# Patient Record
Sex: FEMALE | Race: WHITE | NOT HISPANIC OR LATINO | Employment: FULL TIME | URBAN - METROPOLITAN AREA
[De-identification: names, ages, dates, MRNs, and addresses within clinical notes are randomized per-mention and may not be internally consistent; named-entity substitution may affect disease eponyms.]

---

## 2021-02-19 ENCOUNTER — APPOINTMENT (OUTPATIENT)
Dept: PREADMISSION TESTING | Facility: HOSPITAL | Age: 71
End: 2021-02-19
Attending: ORTHOPAEDIC SURGERY
Payer: MEDICARE

## 2021-02-19 ENCOUNTER — TRANSCRIBE ORDERS (OUTPATIENT)
Dept: REGISTRATION | Facility: HOSPITAL | Age: 71
End: 2021-02-19

## 2021-02-19 ENCOUNTER — APPOINTMENT (OUTPATIENT)
Dept: LAB | Facility: HOSPITAL | Age: 71
End: 2021-02-19
Attending: ORTHOPAEDIC SURGERY
Payer: MEDICARE

## 2021-02-19 VITALS
RESPIRATION RATE: 18 BRPM | WEIGHT: 145 LBS | DIASTOLIC BLOOD PRESSURE: 86 MMHG | TEMPERATURE: 98.5 F | OXYGEN SATURATION: 99 % | SYSTOLIC BLOOD PRESSURE: 138 MMHG | BODY MASS INDEX: 25.69 KG/M2 | HEIGHT: 63 IN | HEART RATE: 86 BPM

## 2021-02-19 DIAGNOSIS — M54.16 RADICULOPATHY, LUMBAR REGION: Primary | ICD-10-CM

## 2021-02-19 DIAGNOSIS — M54.16 RADICULOPATHY, LUMBAR REGION: ICD-10-CM

## 2021-02-19 DIAGNOSIS — M48.062 SPINAL STENOSIS, LUMBAR REGION WITH NEUROGENIC CLAUDICATION: ICD-10-CM

## 2021-02-19 DIAGNOSIS — Z01.818 ENCOUNTER FOR OTHER PREPROCEDURAL EXAMINATION: ICD-10-CM

## 2021-02-19 LAB
ABO + RH BLD: NORMAL
ANION GAP SERPL CALC-SCNC: 9 MEQ/L (ref 3–15)
APTT PPP: 28 SEC (ref 23–35)
BASOPHILS # BLD: 0.02 K/UL (ref 0.01–0.1)
BASOPHILS NFR BLD: 0.4 %
BLD GP AB SCN SERPL QL: NEGATIVE
BLOOD BANK CMNT PATIENT-IMP: NORMAL
BUN SERPL-MCNC: 27 MG/DL (ref 8–20)
CALCIUM SERPL-MCNC: 9.3 MG/DL (ref 8.9–10.3)
CHLORIDE SERPL-SCNC: 105 MEQ/L (ref 98–109)
CO2 SERPL-SCNC: 29 MEQ/L (ref 22–32)
CREAT SERPL-MCNC: 0.8 MG/DL (ref 0.6–1.1)
D AG BLD QL: POSITIVE
DIFFERENTIAL METHOD BLD: ABNORMAL
EOSINOPHIL # BLD: 0.11 K/UL (ref 0.04–0.36)
EOSINOPHIL NFR BLD: 2 %
ERYTHROCYTE [DISTWIDTH] IN BLOOD BY AUTOMATED COUNT: 12.1 % (ref 11.7–14.4)
GFR SERPL CREATININE-BSD FRML MDRD: >60 ML/MIN/1.73M*2
GLUCOSE SERPL-MCNC: 108 MG/DL (ref 70–99)
HCT VFR BLDCO AUTO: 45.6 % (ref 35–45)
HGB BLD-MCNC: 14.6 G/DL (ref 11.8–15.7)
IMM GRANULOCYTES # BLD AUTO: 0.02 K/UL (ref 0–0.08)
IMM GRANULOCYTES NFR BLD AUTO: 0.4 %
INR PPP: 0.9
LABORATORY COMMENT REPORT: NORMAL
LYMPHOCYTES # BLD: 0.87 K/UL (ref 1.2–3.5)
LYMPHOCYTES NFR BLD: 15.8 %
MCH RBC QN AUTO: 29.9 PG (ref 28–33.2)
MCHC RBC AUTO-ENTMCNC: 32 G/DL (ref 32.2–35.5)
MCV RBC AUTO: 93.4 FL (ref 83–98)
MONOCYTES # BLD: 0.32 K/UL (ref 0.28–0.8)
MONOCYTES NFR BLD: 5.8 %
NEUTROPHILS # BLD: 4.18 K/UL (ref 1.7–7)
NEUTS SEG NFR BLD: 75.6 %
NRBC BLD-RTO: 0 %
PDW BLD AUTO: 11.4 FL (ref 9.4–12.3)
PLATELET # BLD AUTO: 193 K/UL (ref 150–369)
POTASSIUM SERPL-SCNC: 4.1 MEQ/L (ref 3.6–5.1)
PROTHROMBIN TIME: 12.6 SEC (ref 12.2–14.5)
RBC # BLD AUTO: 4.88 M/UL (ref 3.93–5.22)
SODIUM SERPL-SCNC: 143 MEQ/L (ref 136–144)
WBC # BLD AUTO: 5.52 K/UL (ref 3.8–10.5)

## 2021-02-19 PROCEDURE — 99202 OFFICE O/P NEW SF 15 MIN: CPT | Performed by: INTERNAL MEDICINE

## 2021-02-19 PROCEDURE — U0003 INFECTIOUS AGENT DETECTION BY NUCLEIC ACID (DNA OR RNA); SEVERE ACUTE RESPIRATORY SYNDROME CORONAVIRUS 2 (SARS-COV-2) (CORONAVIRUS DISEASE [COVID-19]), AMPLIFIED PROBE TECHNIQUE, MAKING USE OF HIGH THROUGHPUT TECHNOLOGIES AS DESCRIBED BY CMS-2020-01-R: HCPCS

## 2021-02-19 PROCEDURE — 85025 COMPLETE CBC W/AUTO DIFF WBC: CPT

## 2021-02-19 PROCEDURE — C9803 HOPD COVID-19 SPEC COLLECT: HCPCS

## 2021-02-19 PROCEDURE — 85730 THROMBOPLASTIN TIME PARTIAL: CPT | Mod: GA

## 2021-02-19 PROCEDURE — 85610 PROTHROMBIN TIME: CPT | Mod: GA

## 2021-02-19 PROCEDURE — 36415 COLL VENOUS BLD VENIPUNCTURE: CPT

## 2021-02-19 PROCEDURE — 86901 BLOOD TYPING SEROLOGIC RH(D): CPT

## 2021-02-19 PROCEDURE — 80048 BASIC METABOLIC PNL TOTAL CA: CPT

## 2021-02-19 NOTE — CONSULTS
[facility] Medicine Service -  Pre-Operative Consultation       Patient Name: Zaria Peck  Referring Surgeon: Francisco    Reason for Referral: Pre-Operative Evaluation  Surgical Procedure: L2-L5 Posterior Lumbar Decompression  Operative Date: 2/24/21  Other Providers:    PCP: yT Snell DO          HISTORY OF PRESENT ILLNESS      Zaria Peck is a 71 y.o. female presenting today to the [facility] at  for pre-operative evaluation prior to planned surgery.      In regards to medical history: The patient has a pmhx of osteoporosis, lumbar stenosis with radiculopathy, anxiety, h/o LBBB. She has no history of heart disease, lung disease. She denies any CP, SOB, PHILLIPS, palps. No limitations of activity or ADL's sat home due to cardiopulmonary symptoms. Denies any current complaints other than back pain.    The patient denies any current or recent chest pain or pressure, dyspnea, cough, sputum, fevers, chills, abdominal pain, nausea, vomiting, diarrhea or other symptoms.     Functionally, the patient is able to ascend a flight or so of stairs with no dyspnea or chest pain.     The patient denies, on specific questioning, the following:  No history of MI, arrhythmia,or CHF.  No history of RAKESH.  No history of DVT/PE.  No history of COPD.  No history of CVA.  No history of DM.   No history of CKD.     PAST MEDICAL AND SURGICAL HISTORY      Past Medical History:   Diagnosis Date   • Anxiety    • Left bundle branch block (LBBB)    • Lumbar radiculopathy    • Lumbar stenosis    • Osteoporosis    • Use of cane as ambulatory aid        Past Surgical History:   Procedure Laterality Date   • CHOLECYSTECTOMY     • EYE SURGERY      dettached retina left   • FOOT SURGERY Bilateral    • KNEE CARTILAGE SURGERY Bilateral        MEDICATIONS        Current Outpatient Medications:   •  acetaminophen (TYLENOL) 325 mg tablet, Take by mouth every 6 (six) hours as needed for mild pain.,  "Disp: , Rfl:   •  diazePAM (VALIUM) 2 mg tablet, Take 2 mg by mouth every 6 (six) hours as needed for anxiety., Disp: , Rfl:   •  HYDROcodone-acetaminophen (NORCO) 7.5-325 mg per tablet, Take 1 tablet by mouth every 6 (six) hours as needed for moderate pain., Disp: , Rfl:   •  raloxifene (EVISTA) 60 mg tablet, Take 60 mg by mouth daily., Disp: , Rfl:     ALLERGIES      Patient has no known allergies.    FAMILY HISTORY      family history is not on file.    Denies any prior known family history of DVTs/PEs/clotting disorder    SOCIAL HISTORY      Social History     Tobacco Use   • Smoking status: Never Smoker   • Smokeless tobacco: Never Used   Substance Use Topics   • Alcohol use: Yes     Alcohol/week: 3.0 standard drinks     Types: 3 Glasses of wine per week   • Drug use: Never       REVIEW OF SYSTEMS      All other systems reviewed and negative except as noted in HPI    PHYSICAL EXAMINATION      Visit Vitals  /86 (BP Location: Left upper arm, Patient Position: Sitting)   Pulse 86   Temp 36.9 °C (98.5 °F) (Temporal)   Resp 18   Ht 1.6 m (5' 3\")   Wt 65.8 kg (145 lb)   SpO2 99%   BMI 25.69 kg/m²     Body mass index is 25.69 kg/m².    Physical Exam  Vitals signs and nursing note reviewed.   Constitutional:       General: She is not in acute distress.     Appearance: Normal appearance. She is not ill-appearing, toxic-appearing or diaphoretic.   HENT:      Head: Normocephalic and atraumatic.      Right Ear: External ear normal.      Left Ear: External ear normal.      Nose: Nose normal.   Eyes:      General: No scleral icterus.        Right eye: No discharge.         Left eye: No discharge.      Pupils: Pupils are equal, round, and reactive to light.   Neck:      Musculoskeletal: Normal range of motion. No neck rigidity.   Cardiovascular:      Rate and Rhythm: Normal rate and regular rhythm.      Pulses: Normal pulses.      Heart sounds: Normal heart sounds. No murmur. No friction rub. No gallop.    Pulmonary:    "   Effort: Pulmonary effort is normal. No respiratory distress.      Breath sounds: Normal breath sounds. No stridor. No wheezing, rhonchi or rales.   Chest:      Chest wall: No tenderness.   Abdominal:      General: Abdomen is flat. There is no distension.      Palpations: Abdomen is soft. There is no mass.      Tenderness: There is no abdominal tenderness. There is no guarding or rebound.      Hernia: No hernia is present.   Musculoskeletal:         General: No swelling, tenderness, deformity or signs of injury.      Right lower leg: No edema.      Left lower leg: No edema.   Skin:     General: Skin is warm and dry.      Coloration: Skin is not jaundiced or pale.      Findings: No bruising, erythema, lesion or rash.   Neurological:      General: No focal deficit present.      Mental Status: She is alert and oriented to person, place, and time.      Comments: Sitting in wheelchair due to weakness and back pain.      Psychiatric:         Mood and Affect: Mood normal.         Behavior: Behavior normal.         Thought Content: Thought content normal.         Judgment: Judgment normal.         LABS / EKG        Labs  WB C 5.5  Hb 14.6  Plt 193    Creat 0.8  K 4.1  Rest of Chem 7 negative.       ECG/Telemetry  EKG: NSR, rate 70's, left axis deviation, LBBB, no signs of acute ischemia.   No other EKG on file.     ASSESSMENT AND PLAN         No problem-specific Assessment & Plan notes found for this encounter.       In regards to perioperative cardiac risk:  The patient denies any history of ischemic heart disease, denies any history of CHF, denies any history of CVA, is not on pre-operative treatment with insulin, and does not have a pre-operative creatinine > 2 mg/dL.   The Revised Cardiac Risk Index (RCRI) for this patient indicates Class I risk, score of 0 points.   [place] Perioperative Risk Score is 0.     The patient has no history of cardiopulmonary disease, has no current complaints, has stable vital signs, a  normal heart and lung exam today. She has an EKG which shows a known LBBB, but no signs of ischemia. Labs are normal today.     The patient is a low risk patient for a low risk spinal surgery. No further testing is needed at this time.    Further comments:  Resume supplements when OK with surgical team.  I would encourage incentive spirometry to assist with minimizing say-operative pulmonary risk.  DVT prophylaxis and timing of such per the discretion of the surgeon.     Please do not hesitate to contact [facility] during the upcoming hospitalization with any questions or concerns.     Josesito Tillman MD  2/19/2021

## 2021-02-19 NOTE — H&P (VIEW-ONLY)
[facility] Medicine Service -  Pre-Operative Consultation       Patient Name: Zaria Peck  Referring Surgeon: Francisco    Reason for Referral: Pre-Operative Evaluation  Surgical Procedure: L2-L5 Posterior Lumbar Decompression  Operative Date: 2/24/21  Other Providers:    PCP: Ty Snell DO          HISTORY OF PRESENT ILLNESS      Zaria Peck is a 71 y.o. female presenting today to the [facility] at  for pre-operative evaluation prior to planned surgery.      In regards to medical history: The patient has a pmhx of osteoporosis, lumbar stenosis with radiculopathy, anxiety, h/o LBBB. She has no history of heart disease, lung disease. She denies any CP, SOB, PHILLIPS, palps. No limitations of activity or ADL's sat home due to cardiopulmonary symptoms. Denies any current complaints other than back pain.    The patient denies any current or recent chest pain or pressure, dyspnea, cough, sputum, fevers, chills, abdominal pain, nausea, vomiting, diarrhea or other symptoms.     Functionally, the patient is able to ascend a flight or so of stairs with no dyspnea or chest pain.     The patient denies, on specific questioning, the following:  No history of MI, arrhythmia,or CHF.  No history of RAKESH.  No history of DVT/PE.  No history of COPD.  No history of CVA.  No history of DM.   No history of CKD.     PAST MEDICAL AND SURGICAL HISTORY      Past Medical History:   Diagnosis Date   • Anxiety    • Left bundle branch block (LBBB)    • Lumbar radiculopathy    • Lumbar stenosis    • Osteoporosis    • Use of cane as ambulatory aid        Past Surgical History:   Procedure Laterality Date   • CHOLECYSTECTOMY     • EYE SURGERY      dettached retina left   • FOOT SURGERY Bilateral    • KNEE CARTILAGE SURGERY Bilateral        MEDICATIONS        Current Outpatient Medications:   •  acetaminophen (TYLENOL) 325 mg tablet, Take by mouth every 6 (six) hours as needed for mild pain.,  "Disp: , Rfl:   •  diazePAM (VALIUM) 2 mg tablet, Take 2 mg by mouth every 6 (six) hours as needed for anxiety., Disp: , Rfl:   •  HYDROcodone-acetaminophen (NORCO) 7.5-325 mg per tablet, Take 1 tablet by mouth every 6 (six) hours as needed for moderate pain., Disp: , Rfl:   •  raloxifene (EVISTA) 60 mg tablet, Take 60 mg by mouth daily., Disp: , Rfl:     ALLERGIES      Patient has no known allergies.    FAMILY HISTORY      family history is not on file.    Denies any prior known family history of DVTs/PEs/clotting disorder    SOCIAL HISTORY      Social History     Tobacco Use   • Smoking status: Never Smoker   • Smokeless tobacco: Never Used   Substance Use Topics   • Alcohol use: Yes     Alcohol/week: 3.0 standard drinks     Types: 3 Glasses of wine per week   • Drug use: Never       REVIEW OF SYSTEMS      All other systems reviewed and negative except as noted in HPI    PHYSICAL EXAMINATION      Visit Vitals  /86 (BP Location: Left upper arm, Patient Position: Sitting)   Pulse 86   Temp 36.9 °C (98.5 °F) (Temporal)   Resp 18   Ht 1.6 m (5' 3\")   Wt 65.8 kg (145 lb)   SpO2 99%   BMI 25.69 kg/m²     Body mass index is 25.69 kg/m².    Physical Exam  Vitals signs and nursing note reviewed.   Constitutional:       General: She is not in acute distress.     Appearance: Normal appearance. She is not ill-appearing, toxic-appearing or diaphoretic.   HENT:      Head: Normocephalic and atraumatic.      Right Ear: External ear normal.      Left Ear: External ear normal.      Nose: Nose normal.   Eyes:      General: No scleral icterus.        Right eye: No discharge.         Left eye: No discharge.      Pupils: Pupils are equal, round, and reactive to light.   Neck:      Musculoskeletal: Normal range of motion. No neck rigidity.   Cardiovascular:      Rate and Rhythm: Normal rate and regular rhythm.      Pulses: Normal pulses.      Heart sounds: Normal heart sounds. No murmur. No friction rub. No gallop.    Pulmonary:    "   Effort: Pulmonary effort is normal. No respiratory distress.      Breath sounds: Normal breath sounds. No stridor. No wheezing, rhonchi or rales.   Chest:      Chest wall: No tenderness.   Abdominal:      General: Abdomen is flat. There is no distension.      Palpations: Abdomen is soft. There is no mass.      Tenderness: There is no abdominal tenderness. There is no guarding or rebound.      Hernia: No hernia is present.   Musculoskeletal:         General: No swelling, tenderness, deformity or signs of injury.      Right lower leg: No edema.      Left lower leg: No edema.   Skin:     General: Skin is warm and dry.      Coloration: Skin is not jaundiced or pale.      Findings: No bruising, erythema, lesion or rash.   Neurological:      General: No focal deficit present.      Mental Status: She is alert and oriented to person, place, and time.      Comments: Sitting in wheelchair due to weakness and back pain.      Psychiatric:         Mood and Affect: Mood normal.         Behavior: Behavior normal.         Thought Content: Thought content normal.         Judgment: Judgment normal.         LABS / EKG        Labs  WB C 5.5  Hb 14.6  Plt 193    Creat 0.8  K 4.1  Rest of Chem 7 negative.       ECG/Telemetry  EKG: NSR, rate 70's, left axis deviation, LBBB, no signs of acute ischemia.   No other EKG on file.     ASSESSMENT AND PLAN         No problem-specific Assessment & Plan notes found for this encounter.       In regards to perioperative cardiac risk:  The patient denies any history of ischemic heart disease, denies any history of CHF, denies any history of CVA, is not on pre-operative treatment with insulin, and does not have a pre-operative creatinine > 2 mg/dL.   The Revised Cardiac Risk Index (RCRI) for this patient indicates Class I risk, score of 0 points.   [place] Perioperative Risk Score is 0.     The patient has no history of cardiopulmonary disease, has no current complaints, has stable vital signs, a  normal heart and lung exam today. She has an EKG which shows a known LBBB, but no signs of ischemia. Labs are normal today.     The patient is a low risk patient for a low risk spinal surgery. No further testing is needed at this time.    Further comments:  Resume supplements when OK with surgical team.  I would encourage incentive spirometry to assist with minimizing say-operative pulmonary risk.  DVT prophylaxis and timing of such per the discretion of the surgeon.     Please do not hesitate to contact [facility] [place] during the upcoming hospitalization with any questions or concerns.     Josesito Tillman MD  2/19/2021

## 2021-02-20 LAB — SARS-COV-2 RNA RESP QL NAA+PROBE: NOT DETECTED

## 2021-02-23 ENCOUNTER — ANESTHESIA EVENT (OUTPATIENT)
Dept: OPERATING ROOM | Facility: HOSPITAL | Age: 71
Setting detail: SURGERY ADMIT
DRG: 455 | End: 2021-02-23
Payer: MEDICARE

## 2021-02-23 NOTE — ANESTHESIOLOGIST PRE-PROCEDURE CHART REVIEW
I confirm that I have reviewed the available information in the medical record prior to the scheduled surgery.  There is no problem list on file for this patient.      Past Surgical History:   Procedure Laterality Date   • CHOLECYSTECTOMY     • EYE SURGERY      dettached retina left   • FOOT SURGERY Bilateral    • KNEE CARTILAGE SURGERY Bilateral        No current facility-administered medications for this encounter.        Prior to Admission medications    Medication Sig Start Date End Date Taking? Authorizing Provider   acetaminophen (TYLENOL) 325 mg tablet Take by mouth every 6 (six) hours as needed for mild pain.    Jesse Azar MD   diazePAM (VALIUM) 2 mg tablet Take 2 mg by mouth every 6 (six) hours as needed for anxiety.    Jesse Azar MD   HYDROcodone-acetaminophen (NORCO) 7.5-325 mg per tablet Take 1 tablet by mouth every 6 (six) hours as needed for moderate pain.    Jesse Azar MD   raloxifene (EVISTA) 60 mg tablet Take 60 mg by mouth daily.    Jesse Azar MD       CBC Results       02/19/21 06/13/15 06/12/15                    0852 [id number] [id number]         WBC 5.52 6.32 7.53         RBC 4.88 3.48 3.66         HGB 14.6 10.6 11.2         HCT 45.6 32.4 34.4         MCV 93.4 93.1 94.0         MCH 29.9 30.5 30.6         MCHC 32.0 32.7 32.6          131 139                       BMP Results       02/19/21 06/13/15 06/12/15                    0852 0346 0437          140 139         K 4.1 3.9 3.7         Cl 105 110 109         CO2 29 27 27         Glucose 108 91 104         BUN 27 12 17         Creatinine 0.8 0.6 0.8         Calcium 9.3 7.9 7.8         Anion Gap 9 3 3         EGFR >60.0 -- --                       No results found for: HCGPREGUR, PREGSERUM, HCG, HCGQUANT    Results from last 7 days   Lab Units 02/19/21  0852   INR  0.9   PTT sec 28       X-RAY LUMBAR SPINE 2 OR 3 VIEWS    (Results Pending)   FL FLUOROSCOPY TECHNICAL ASSISTANCE    (Results Pending)

## 2021-02-24 ENCOUNTER — HOSPITAL ENCOUNTER (OUTPATIENT)
Dept: RADIOLOGY | Facility: HOSPITAL | Age: 71
Setting detail: SURGERY ADMIT
Discharge: HOME | DRG: 455 | End: 2021-02-24
Attending: ORTHOPAEDIC SURGERY
Payer: MEDICARE

## 2021-02-24 ENCOUNTER — HOSPITAL ENCOUNTER (OUTPATIENT)
Dept: RADIOLOGY | Facility: HOSPITAL | Age: 71
Setting detail: SURGERY ADMIT
DRG: 455 | End: 2021-02-24
Attending: ORTHOPAEDIC SURGERY
Payer: MEDICARE

## 2021-02-24 ENCOUNTER — HOSPITAL ENCOUNTER (INPATIENT)
Facility: HOSPITAL | Age: 71
LOS: 4 days | Discharge: HOME HEALTH | DRG: 455 | End: 2021-02-28
Attending: ORTHOPAEDIC SURGERY | Admitting: ORTHOPAEDIC SURGERY
Payer: MEDICARE

## 2021-02-24 ENCOUNTER — ANESTHESIA (OUTPATIENT)
Dept: OPERATING ROOM | Facility: HOSPITAL | Age: 71
Setting detail: SURGERY ADMIT
DRG: 455 | End: 2021-02-24
Payer: MEDICARE

## 2021-02-24 DIAGNOSIS — Z98.890 STATUS POST LUMBAR SPINE SURGERY FOR DECOMPRESSION OF SPINAL CORD: ICD-10-CM

## 2021-02-24 PROBLEM — K21.9 GASTROESOPHAGEAL REFLUX DISEASE WITHOUT ESOPHAGITIS: Status: ACTIVE | Noted: 2021-02-24

## 2021-02-24 PROBLEM — I44.7 LBBB (LEFT BUNDLE BRANCH BLOCK): Status: ACTIVE | Noted: 2021-02-24

## 2021-02-24 PROBLEM — I10 ESSENTIAL HYPERTENSION: Status: ACTIVE | Noted: 2021-02-24

## 2021-02-24 PROCEDURE — 63600000 HC DRUGS/DETAIL CODE: Performed by: ORTHOPAEDIC SURGERY

## 2021-02-24 PROCEDURE — 25000000 HC PHARMACY GENERAL: Performed by: STUDENT IN AN ORGANIZED HEALTH CARE EDUCATION/TRAINING PROGRAM

## 2021-02-24 PROCEDURE — C1713 ANCHOR/SCREW BN/BN,TIS/BN: HCPCS | Performed by: ORTHOPAEDIC SURGERY

## 2021-02-24 PROCEDURE — 25000000 HC PHARMACY GENERAL: Performed by: ORTHOPAEDIC SURGERY

## 2021-02-24 PROCEDURE — 63700000 HC SELF-ADMINISTRABLE DRUG: Performed by: STUDENT IN AN ORGANIZED HEALTH CARE EDUCATION/TRAINING PROGRAM

## 2021-02-24 PROCEDURE — 25800000 HC PHARMACY IV SOLUTIONS: Performed by: STUDENT IN AN ORGANIZED HEALTH CARE EDUCATION/TRAINING PROGRAM

## 2021-02-24 PROCEDURE — 63600000 HC DRUGS/DETAIL CODE: Performed by: ANESTHESIOLOGY

## 2021-02-24 PROCEDURE — 71000001 HC PACU PHASE 1 INITIAL 30MIN: Performed by: ORTHOPAEDIC SURGERY

## 2021-02-24 PROCEDURE — 25000000 HC PHARMACY GENERAL: Performed by: NURSE ANESTHETIST, CERTIFIED REGISTERED

## 2021-02-24 PROCEDURE — 36000014 HC OR LEVEL 4 EA ADDL MIN: Performed by: ORTHOPAEDIC SURGERY

## 2021-02-24 PROCEDURE — 27800000 HC SUPPLY/IMPLANTS: Performed by: ORTHOPAEDIC SURGERY

## 2021-02-24 PROCEDURE — 36000025 HC CELL SAVER PROCED

## 2021-02-24 PROCEDURE — 63600000 HC DRUGS/DETAIL CODE: Performed by: STUDENT IN AN ORGANIZED HEALTH CARE EDUCATION/TRAINING PROGRAM

## 2021-02-24 PROCEDURE — 63600000 HC DRUGS/DETAIL CODE: Performed by: NURSE ANESTHETIST, CERTIFIED REGISTERED

## 2021-02-24 PROCEDURE — 25800000 HC PHARMACY IV SOLUTIONS: Performed by: NURSE ANESTHETIST, CERTIFIED REGISTERED

## 2021-02-24 PROCEDURE — 36000004 HC OR LEVEL 4 INITIAL 30MIN: Performed by: ORTHOPAEDIC SURGERY

## 2021-02-24 PROCEDURE — 72100 X-RAY EXAM L-S SPINE 2/3 VWS: CPT

## 2021-02-24 PROCEDURE — 99232 SBSQ HOSP IP/OBS MODERATE 35: CPT | Performed by: INTERNAL MEDICINE

## 2021-02-24 PROCEDURE — 37000001 HC ANESTHESIA GENERAL: Performed by: ORTHOPAEDIC SURGERY

## 2021-02-24 PROCEDURE — 12000000 HC ROOM AND CARE MED/SURG

## 2021-02-24 PROCEDURE — 71000011 HC PACU PHASE 1 EA ADDL MIN: Performed by: ORTHOPAEDIC SURGERY

## 2021-02-24 PROCEDURE — 27200000 HC STERILE SUPPLY: Performed by: ORTHOPAEDIC SURGERY

## 2021-02-24 PROCEDURE — 25800000 HC PHARMACY IV SOLUTIONS: Performed by: ORTHOPAEDIC SURGERY

## 2021-02-24 DEVICE — SCREW EXPEDIUM 7.0 X 45MM: Type: IMPLANTABLE DEVICE | Site: SPINE LUMBAR | Status: FUNCTIONAL

## 2021-02-24 DEVICE — SCREW EXPEDIUM 7.5 X 45MM: Type: IMPLANTABLE DEVICE | Site: SPINE LUMBAR | Status: FUNCTIONAL

## 2021-02-24 DEVICE — CAGE 12 X 26MM/9-13MM EXPANDABLE CALIBUR: Type: IMPLANTABLE DEVICE | Site: SPINE LUMBAR | Status: FUNCTIONAL

## 2021-02-24 DEVICE — BONE CHIPS 30CC FINE FILLER: Type: IMPLANTABLE DEVICE | Site: SPINE LUMBAR | Status: FUNCTIONAL

## 2021-02-24 DEVICE — ROD 90MM SPINE: Type: IMPLANTABLE DEVICE | Site: SPINE LUMBAR | Status: FUNCTIONAL

## 2021-02-24 DEVICE — SET SCREWS EXPEDIUM: Type: IMPLANTABLE DEVICE | Site: SPINE LUMBAR | Status: FUNCTIONAL

## 2021-02-24 DEVICE — ROD EXPEDIUM 85MM: Type: IMPLANTABLE DEVICE | Site: SPINE LUMBAR | Status: FUNCTIONAL

## 2021-02-24 DEVICE — SCREW EXPEDIUM 6 X 45MM: Type: IMPLANTABLE DEVICE | Site: SPINE LUMBAR | Status: FUNCTIONAL

## 2021-02-24 DEVICE — BOAT 3DEMIN OSTEOSPONGE 2.5CM X 10CM: Type: IMPLANTABLE DEVICE | Site: SPINE LUMBAR | Status: FUNCTIONAL

## 2021-02-24 RX ORDER — POLYETHYLENE GLYCOL 3350 17 G/17G
17 POWDER, FOR SOLUTION ORAL DAILY
Status: DISCONTINUED | OUTPATIENT
Start: 2021-02-24 | End: 2021-02-28 | Stop reason: HOSPADM

## 2021-02-24 RX ORDER — SODIUM CHLORIDE 9 MG/ML
INJECTION, SOLUTION INTRAVENOUS CONTINUOUS
Status: ACTIVE | OUTPATIENT
Start: 2021-02-24 | End: 2021-02-25

## 2021-02-24 RX ORDER — PANTOPRAZOLE SODIUM 20 MG/1
20 TABLET, DELAYED RELEASE ORAL DAILY
Status: DISCONTINUED | OUTPATIENT
Start: 2021-02-24 | End: 2021-02-28 | Stop reason: HOSPADM

## 2021-02-24 RX ORDER — ONDANSETRON 4 MG/1
4 TABLET, ORALLY DISINTEGRATING ORAL EVERY 8 HOURS PRN
Status: DISCONTINUED | OUTPATIENT
Start: 2021-02-24 | End: 2021-02-28 | Stop reason: HOSPADM

## 2021-02-24 RX ORDER — ONDANSETRON HYDROCHLORIDE 2 MG/ML
4 INJECTION, SOLUTION INTRAVENOUS
Status: DISCONTINUED | OUTPATIENT
Start: 2021-02-24 | End: 2021-02-24 | Stop reason: HOSPADM

## 2021-02-24 RX ORDER — SODIUM CHLORIDE 0.9 G/100ML
INJECTION, SOLUTION IRRIGATION AS NEEDED
Status: DISCONTINUED | OUTPATIENT
Start: 2021-02-24 | End: 2021-02-24 | Stop reason: HOSPADM

## 2021-02-24 RX ORDER — ONDANSETRON HYDROCHLORIDE 2 MG/ML
INJECTION, SOLUTION INTRAVENOUS AS NEEDED
Status: DISCONTINUED | OUTPATIENT
Start: 2021-02-24 | End: 2021-02-24 | Stop reason: SURG

## 2021-02-24 RX ORDER — SODIUM CHLORIDE 9 MG/ML
INJECTION, SOLUTION INTRAVENOUS CONTINUOUS
Status: DISCONTINUED | OUTPATIENT
Start: 2021-02-24 | End: 2021-02-24

## 2021-02-24 RX ORDER — FENTANYL CITRATE 50 UG/ML
50 INJECTION, SOLUTION INTRAMUSCULAR; INTRAVENOUS EVERY 5 MIN PRN
Status: DISCONTINUED | OUTPATIENT
Start: 2021-02-24 | End: 2021-02-24 | Stop reason: HOSPADM

## 2021-02-24 RX ORDER — AMOXICILLIN 250 MG
1 CAPSULE ORAL 2 TIMES DAILY
Status: DISCONTINUED | OUTPATIENT
Start: 2021-02-24 | End: 2021-02-28 | Stop reason: HOSPADM

## 2021-02-24 RX ORDER — HYDROMORPHONE HYDROCHLORIDE 1 MG/ML
INJECTION, SOLUTION INTRAMUSCULAR; INTRAVENOUS; SUBCUTANEOUS AS NEEDED
Status: DISCONTINUED | OUTPATIENT
Start: 2021-02-24 | End: 2021-02-24 | Stop reason: SURG

## 2021-02-24 RX ORDER — HYDROMORPHONE HYDROCHLORIDE 1 MG/ML
1 INJECTION, SOLUTION INTRAMUSCULAR; INTRAVENOUS; SUBCUTANEOUS ONCE
Status: COMPLETED | OUTPATIENT
Start: 2021-02-24 | End: 2021-02-24

## 2021-02-24 RX ORDER — CEFAZOLIN SODIUM/WATER 2 G/20 ML
2 SYRINGE (ML) INTRAVENOUS ONCE
Status: COMPLETED | OUTPATIENT
Start: 2021-02-24 | End: 2021-02-24

## 2021-02-24 RX ORDER — DEXTROSE 40 %
15-30 GEL (GRAM) ORAL AS NEEDED
Status: DISCONTINUED | OUTPATIENT
Start: 2021-02-24 | End: 2021-02-28 | Stop reason: HOSPADM

## 2021-02-24 RX ORDER — IBUPROFEN 200 MG
16-32 TABLET ORAL AS NEEDED
Status: DISCONTINUED | OUTPATIENT
Start: 2021-02-24 | End: 2021-02-24 | Stop reason: SDUPTHER

## 2021-02-24 RX ORDER — FENTANYL CITRATE 50 UG/ML
INJECTION, SOLUTION INTRAMUSCULAR; INTRAVENOUS AS NEEDED
Status: DISCONTINUED | OUTPATIENT
Start: 2021-02-24 | End: 2021-02-24 | Stop reason: SURG

## 2021-02-24 RX ORDER — SODIUM CHLORIDE, SODIUM GLUCONATE, SODIUM ACETATE, POTASSIUM CHLORIDE AND MAGNESIUM CHLORIDE 30; 37; 368; 526; 502 MG/100ML; MG/100ML; MG/100ML; MG/100ML; MG/100ML
INJECTION, SOLUTION INTRAVENOUS CONTINUOUS PRN
Status: DISCONTINUED | OUTPATIENT
Start: 2021-02-24 | End: 2021-02-24 | Stop reason: SURG

## 2021-02-24 RX ORDER — HYDROMORPHONE HYDROCHLORIDE 1 MG/ML
0.5 INJECTION, SOLUTION INTRAMUSCULAR; INTRAVENOUS; SUBCUTANEOUS
Status: DISCONTINUED | OUTPATIENT
Start: 2021-02-24 | End: 2021-02-24 | Stop reason: HOSPADM

## 2021-02-24 RX ORDER — VANCOMYCIN HYDROCHLORIDE 1 G/20ML
INJECTION, POWDER, LYOPHILIZED, FOR SOLUTION INTRAVENOUS AS NEEDED
Status: DISCONTINUED | OUTPATIENT
Start: 2021-02-24 | End: 2021-02-24 | Stop reason: HOSPADM

## 2021-02-24 RX ORDER — LIDOCAINE HYDROCHLORIDE 10 MG/ML
INJECTION, SOLUTION INFILTRATION; PERINEURAL AS NEEDED
Status: DISCONTINUED | OUTPATIENT
Start: 2021-02-24 | End: 2021-02-24 | Stop reason: SURG

## 2021-02-24 RX ORDER — HYDROMORPHONE HYDROCHLORIDE 1 MG/ML
0.5 INJECTION, SOLUTION INTRAMUSCULAR; INTRAVENOUS; SUBCUTANEOUS EVERY 4 HOURS PRN
Status: DISCONTINUED | OUTPATIENT
Start: 2021-02-24 | End: 2021-02-26

## 2021-02-24 RX ORDER — IBUPROFEN 200 MG
16-32 TABLET ORAL AS NEEDED
Status: DISCONTINUED | OUTPATIENT
Start: 2021-02-24 | End: 2021-02-28 | Stop reason: HOSPADM

## 2021-02-24 RX ORDER — DEXTROSE 40 %
15-30 GEL (GRAM) ORAL AS NEEDED
Status: DISCONTINUED | OUTPATIENT
Start: 2021-02-24 | End: 2021-02-24 | Stop reason: SDUPTHER

## 2021-02-24 RX ORDER — PROPOFOL 10 MG/ML
INJECTION, EMULSION INTRAVENOUS AS NEEDED
Status: DISCONTINUED | OUTPATIENT
Start: 2021-02-24 | End: 2021-02-24 | Stop reason: SURG

## 2021-02-24 RX ORDER — DIAZEPAM 2 MG/1
2 TABLET ORAL EVERY 6 HOURS PRN
Status: DISCONTINUED | OUTPATIENT
Start: 2021-02-24 | End: 2021-02-24

## 2021-02-24 RX ORDER — MIDAZOLAM HYDROCHLORIDE 2 MG/2ML
INJECTION, SOLUTION INTRAMUSCULAR; INTRAVENOUS AS NEEDED
Status: DISCONTINUED | OUTPATIENT
Start: 2021-02-24 | End: 2021-02-24 | Stop reason: SURG

## 2021-02-24 RX ORDER — ACETAMINOPHEN 325 MG/1
650 TABLET ORAL EVERY 6 HOURS
Status: DISPENSED | OUTPATIENT
Start: 2021-02-24 | End: 2021-02-26

## 2021-02-24 RX ORDER — DEXAMETHASONE SODIUM PHOSPHATE 4 MG/ML
INJECTION, SOLUTION INTRA-ARTICULAR; INTRALESIONAL; INTRAMUSCULAR; INTRAVENOUS; SOFT TISSUE AS NEEDED
Status: DISCONTINUED | OUTPATIENT
Start: 2021-02-24 | End: 2021-02-24 | Stop reason: SURG

## 2021-02-24 RX ORDER — ROCURONIUM BROMIDE 10 MG/ML
INJECTION, SOLUTION INTRAVENOUS AS NEEDED
Status: DISCONTINUED | OUTPATIENT
Start: 2021-02-24 | End: 2021-02-24 | Stop reason: SURG

## 2021-02-24 RX ORDER — OXYCODONE HYDROCHLORIDE 5 MG/1
10 TABLET ORAL EVERY 4 HOURS PRN
Status: DISCONTINUED | OUTPATIENT
Start: 2021-02-24 | End: 2021-02-26

## 2021-02-24 RX ORDER — DEXTROSE 50 % IN WATER (D50W) INTRAVENOUS SYRINGE
25 AS NEEDED
Status: DISCONTINUED | OUTPATIENT
Start: 2021-02-24 | End: 2021-02-24 | Stop reason: SDUPTHER

## 2021-02-24 RX ORDER — DEXTROSE 50 % IN WATER (D50W) INTRAVENOUS SYRINGE
25 AS NEEDED
Status: DISCONTINUED | OUTPATIENT
Start: 2021-02-24 | End: 2021-02-28 | Stop reason: HOSPADM

## 2021-02-24 RX ORDER — DIAZEPAM 5 MG/1
10 TABLET ORAL EVERY 8 HOURS PRN
Status: DISCONTINUED | OUTPATIENT
Start: 2021-02-24 | End: 2021-02-28 | Stop reason: HOSPADM

## 2021-02-24 RX ADMIN — ROCURONIUM BROMIDE 50 MG: 10 INJECTION INTRAVENOUS at 13:55

## 2021-02-24 RX ADMIN — ROCURONIUM BROMIDE 50 MG: 10 INJECTION INTRAVENOUS at 13:26

## 2021-02-24 RX ADMIN — VANCOMYCIN HYDROCHLORIDE 1 G: 1 INJECTION, POWDER, LYOPHILIZED, FOR SOLUTION INTRAVENOUS at 10:10

## 2021-02-24 RX ADMIN — HYDROMORPHONE HYDROCHLORIDE 1 MG: 1 INJECTION, SOLUTION INTRAMUSCULAR; INTRAVENOUS; SUBCUTANEOUS at 16:50

## 2021-02-24 RX ADMIN — SUGAMMADEX 200 MG: 100 INJECTION, SOLUTION INTRAVENOUS at 14:54

## 2021-02-24 RX ADMIN — SODIUM CHLORIDE: 9 INJECTION, SOLUTION INTRAVENOUS at 10:10

## 2021-02-24 RX ADMIN — OXYCODONE HYDROCHLORIDE 10 MG: 5 TABLET ORAL at 20:56

## 2021-02-24 RX ADMIN — MIDAZOLAM HYDROCHLORIDE 2 MG: 1 INJECTION, SOLUTION INTRAMUSCULAR; INTRAVENOUS at 13:21

## 2021-02-24 RX ADMIN — FENTANYL CITRATE 100 MCG: 50 INJECTION INTRAMUSCULAR; INTRAVENOUS at 13:26

## 2021-02-24 RX ADMIN — HYDROMORPHONE HYDROCHLORIDE 0.5 MG: 1 INJECTION, SOLUTION INTRAMUSCULAR; INTRAVENOUS; SUBCUTANEOUS at 15:36

## 2021-02-24 RX ADMIN — FENTANYL CITRATE 100 MCG: 50 INJECTION INTRAMUSCULAR; INTRAVENOUS at 13:21

## 2021-02-24 RX ADMIN — SODIUM CHLORIDE: 9 INJECTION, SOLUTION INTRAVENOUS at 17:06

## 2021-02-24 RX ADMIN — CEFAZOLIN SODIUM 2 G: 10 POWDER, FOR SOLUTION INTRAVENOUS at 20:35

## 2021-02-24 RX ADMIN — HYDROMORPHONE HYDROCHLORIDE 0.5 MG: 1 INJECTION, SOLUTION INTRAMUSCULAR; INTRAVENOUS; SUBCUTANEOUS at 15:26

## 2021-02-24 RX ADMIN — FENTANYL CITRATE 50 MCG: 50 INJECTION, SOLUTION INTRAMUSCULAR; INTRAVENOUS at 16:29

## 2021-02-24 RX ADMIN — PROPOFOL 200 MG: 10 INJECTION, EMULSION INTRAVENOUS at 13:26

## 2021-02-24 RX ADMIN — FENTANYL CITRATE 50 MCG: 50 INJECTION, SOLUTION INTRAMUSCULAR; INTRAVENOUS at 16:59

## 2021-02-24 RX ADMIN — Medication 2 G: at 13:26

## 2021-02-24 RX ADMIN — DEXAMETHASONE SODIUM PHOSPHATE 10 MG: 4 INJECTION, SOLUTION INTRA-ARTICULAR; INTRALESIONAL; INTRAMUSCULAR; INTRAVENOUS; SOFT TISSUE at 13:33

## 2021-02-24 RX ADMIN — HYDROMORPHONE HYDROCHLORIDE 0.5 MG: 1 INJECTION, SOLUTION INTRAMUSCULAR; INTRAVENOUS; SUBCUTANEOUS at 13:58

## 2021-02-24 RX ADMIN — SODIUM CHLORIDE, SODIUM GLUCONATE, SODIUM ACETATE, POTASSIUM CHLORIDE AND MAGNESIUM CHLORIDE: 526; 502; 368; 37; 30 INJECTION, SOLUTION INTRAVENOUS at 15:07

## 2021-02-24 RX ADMIN — ONDANSETRON 4 MG: 2 INJECTION INTRAMUSCULAR; INTRAVENOUS at 14:55

## 2021-02-24 RX ADMIN — DIAZEPAM 10 MG: 5 TABLET ORAL at 16:32

## 2021-02-24 RX ADMIN — LIDOCAINE HYDROCHLORIDE 5 ML: 10 INJECTION, SOLUTION INFILTRATION; PERINEURAL at 13:26

## 2021-02-24 ASSESSMENT — ENCOUNTER SYMPTOMS: DYSRHYTHMIAS: 1

## 2021-02-24 NOTE — OR SURGEON
Pre-Procedure patient identification:  I am the primary operating surgeon/proceduralist and I have identified the patient on 02/24/21 at 1:16 PM Addi Singh MD  Phone Number: [phone]

## 2021-02-24 NOTE — ANESTHESIOLOGIST PRE-PROCEDURE ATTESTATION
Pre-Procedure Patient Identification:  I am the Primary Anesthesiologist and have identified the patient on 02/24/21 at 9:58 AM.   I have confirmed the following procedure(s) L2-5 Posterior Lumbar Decompression, Posterior Lumbar Interbody Fusion, Instrumentation, Cage, Allograft, Autograft will be performed by the following surgeon/proceduralist Addi Singh MD.

## 2021-02-24 NOTE — ANESTHESIOLOGIST PRE-PROCEDURE ATTESTATION
Pre-Procedure Patient Identification:  I am the Primary Anesthesiologist and have identified the patient on 02/24/21 at 11:41 AM.   I have confirmed the following procedure(s) L2-5 Posterior Lumbar Decompression, Posterior Lumbar Interbody Fusion, Instrumentation, Cage, Allograft, Autograft will be performed by the following surgeon/proceduralist Addi Singh MD.

## 2021-02-24 NOTE — ANESTHESIA PROCEDURE NOTES
Airway  Urgency: elective    Start Time: 2/24/2021 1:29 PM  Airway not difficult    General Information and Staff    Patient location during procedure: OR  Anesthesiologist: Dustin Acosta MD  Resident/CRNA: Griffin Tripathi CRNA  Performed: resident/CRNA     Indications and Patient Condition  Indications for airway management: anesthesia  Sedation level: deep  Preoxygenated: yes  Patient position: sniffing  MILS maintained throughout  Mask difficulty assessment: 1 - vent by mask    Final Airway Details  Final airway type: endotracheal airway      Successful airway: ETT    Successful intubation technique: direct laryngoscopy  Facilitating devices/methods: intubating stylet  Endotracheal tube insertion site: oral  Blade: Gladys  Blade size: #3  ETT size (mm): 7.0  Cormack-Lehane Classification: grade I - full view of glottis  Placement verified by: chest auscultation and capnometry   Measured from: lips  Number of attempts at approach: 1  Number of other approaches attempted: 0  Atraumatic airway insertion

## 2021-02-24 NOTE — ANESTHESIA POSTPROCEDURE EVALUATION
Patient: Zaria Peck    Procedure Summary     Date: 02/24/21 Room / Location:  OR 4 / PH OR    Anesthesia Start: 1321 Anesthesia Stop: 1617    Procedure: L2-5 Posterior Lumbar Decompression, Posterior Lumbar Interbody Fusion, Instrumentation, Cage, Allograft, Autograft (N/A Spine Lumbar) Diagnosis:       Lumbar radiculitis      Spinal stenosis of lumbar region with neurogenic claudication      (Lumbar Radic M54.16)      (Lumbar Stenosis M48.062)    Surgeon: Addi Singh MD Responsible Provider: Justino Oro DO    Anesthesia Type: general ASA Status: 2          Anesthesia Type: general  PACU Vitals  2/24/2021 1612 - 2/24/2021 1645      2/24/2021  1618 2/24/2021  1630          BP:  (!) 165/87  (!) 185/95      Temp:  36.6 °C (97.8 °F)  --      Pulse:  81  84      Resp:  18  17      SpO2:  --  99 %              Anesthesia Post Evaluation    Pain management: adequate  Patient location during evaluation: PACU  Patient participation: complete - patient participated  Level of consciousness: awake and alert  Cardiovascular status: acceptable  Airway Patency: adequate  Respiratory status: acceptable  Hydration status: acceptable  Anesthetic complications: no

## 2021-02-24 NOTE — ANESTHESIA PREPROCEDURE EVALUATION
Relevant Problems   No relevant active problems     There is no problem list on file for this patient.      Past Surgical History:   Procedure Laterality Date   • CHOLECYSTECTOMY     • EYE SURGERY      dettached retina left   • FOOT SURGERY Bilateral    • KNEE CARTILAGE SURGERY Bilateral        Current Facility-Administered Medications   Medication Dose Route Frequency   • ceFAZolin in sterile water  2 g intravenous Once   • sodium chloride 0.9 %   intravenous Continuous   • vancomycin  1 g intravenous Once       Prior to Admission medications    Medication Sig Start Date End Date Taking? Authorizing Provider   acetaminophen (TYLENOL) 325 mg tablet Take by mouth every 6 (six) hours as needed for mild pain.   Yes Jesse Azar MD   diazePAM (VALIUM) 2 mg tablet Take 2 mg by mouth every 6 (six) hours as needed for anxiety.   Yes Jesse Azar MD   HYDROcodone-acetaminophen (NORCO) 7.5-325 mg per tablet Take 1 tablet by mouth every 6 (six) hours as needed for moderate pain.   Yes Jesse Azar MD   raloxifene (EVISTA) 60 mg tablet Take 60 mg by mouth daily.   Yes Jesse Azar MD       CBC Results       02/19/21 06/13/15 06/12/15                    0852 0346 0437         WBC 5.52 6.32 7.53         RBC 4.88 3.48 3.66         HGB 14.6 10.6 11.2         HCT 45.6 32.4 34.4         MCV 93.4 93.1 94.0         MCH 29.9 30.5 30.6         MCHC 32.0 32.7 32.6          131 139                       BMP Results       02/19/21 06/13/15 06/12/15                    0852 0346 0437          140 139         K 4.1 3.9 3.7         Cl 105 110 109         CO2 29 27 27         Glucose 108 91 104         BUN 27 12 17         Creatinine 0.8 0.6 0.8         Calcium 9.3 7.9 7.8         Anion Gap 9 3 3         EGFR >60.0 -- --                       No results found for: HCGPREGUR, PREGSERUM, HCG, HCGQUANT    Results from last 7 days   Lab Units 02/19/21  0852   INR  0.9   PTT sec 28       X-RAY LUMBAR  SPINE 2 OR 3 VIEWS    (Results Pending)         Anesthesia ROS/MED HX    Anesthesia History    Previous anesthetics  No family history of anesthetic complications  No history of anesthetic complications  Cardiovascular  Dysrhythmias   Covid19 Test Reviewed and ECG reviewed       Past Surgical History:   Procedure Laterality Date   • CHOLECYSTECTOMY     • EYE SURGERY      dettached retina left   • FOOT SURGERY Bilateral    • KNEE CARTILAGE SURGERY Bilateral        Physical Exam    Airway   Mallampati: II   TM distance: >3 FB   Neck ROM: full  Cardiovascular - normal   Rhythm: regular   Rate: normalPulmonary - normal   clear to auscultation  Dental - normal        Anesthesia Plan    Plan: general    Technique: general endotracheal     Airway: oral intubation   ASA 2  Anesthetic plan and risks discussed with: patient  Induction:    intravenous

## 2021-02-24 NOTE — PROGRESS NOTES
Orthopedic Progress Note    Subjective     Pt seen and examined at bedside, resting comfortably. No acute events since surgery. She is complaining of pain and wanting to fall asleep. She tolerating her clear liquid diet in the room during exam.. Denies CP, SOB, fever, chills. No new complaints at this time.    Objective   Temp:  [36.6 °C (97.8 °F)-37.3 °C (99.1 °F)] 36.7 °C (98.1 °F)  Heart Rate:  [74-84] 75  Resp:  [10-20] 12  BP: (139-185)/(67-95) 159/81  SpO2:  [95 %-100 %] 100 %    General: AVSS, NAD  Head: NC/AT  Resp: no labored breathing  Neuro: awake, alert  Spine:   Dressing C/D/I  RLE  Skin intact  No pain with ROM of ankle/toes  SILT in sural/saphenous/DP/SP/tibial distributions  Motor intact to quad/hamstrings/EHL/FHL/TA/peroneals/GSC  Pulses 2+  LLE  Skin intact  No pain with ROM of ankle/toes  SILT in sural/saphenous/DP/SP/tibial distributions  Motor intact to quad/hamstrings/EHL/FHL/TA/peroneals/GSC  Pulses 2+    Assessment   71F S/P L2-5 PLIF on 2/24 by Dr. Singh    Plan   Pain control  Ancef q8 until drains removed  WBAT all extremities  DVT PPX: SCDs  PT/OT  AM LABS  Dispo planning    Stephen Zpaata DO   Orthopedic Surgery PGY1  Pager: [phone]

## 2021-02-25 LAB
ANION GAP SERPL CALC-SCNC: 9 MEQ/L (ref 3–15)
BUN SERPL-MCNC: 20 MG/DL (ref 8–20)
CALCIUM SERPL-MCNC: 8.1 MG/DL (ref 8.9–10.3)
CHLORIDE SERPL-SCNC: 107 MEQ/L (ref 98–109)
CO2 SERPL-SCNC: 24 MEQ/L (ref 22–32)
CREAT SERPL-MCNC: 0.8 MG/DL (ref 0.6–1.1)
ERYTHROCYTE [DISTWIDTH] IN BLOOD BY AUTOMATED COUNT: 12 % (ref 11.7–14.4)
GFR SERPL CREATININE-BSD FRML MDRD: >60 ML/MIN/1.73M*2
GLUCOSE SERPL-MCNC: 145 MG/DL (ref 70–99)
HCT VFR BLDCO AUTO: 41.2 % (ref 35–45)
HGB BLD-MCNC: 13.1 G/DL (ref 11.8–15.7)
MCH RBC QN AUTO: 29.5 PG (ref 28–33.2)
MCHC RBC AUTO-ENTMCNC: 31.8 G/DL (ref 32.2–35.5)
MCV RBC AUTO: 92.8 FL (ref 83–98)
PDW BLD AUTO: 11.8 FL (ref 9.4–12.3)
PLATELET # BLD AUTO: 156 K/UL (ref 150–369)
POTASSIUM SERPL-SCNC: 4.9 MEQ/L (ref 3.6–5.1)
RBC # BLD AUTO: 4.44 M/UL (ref 3.93–5.22)
SODIUM SERPL-SCNC: 140 MEQ/L (ref 136–144)
WBC # BLD AUTO: 8.54 K/UL (ref 3.8–10.5)

## 2021-02-25 PROCEDURE — 97162 PT EVAL MOD COMPLEX 30 MIN: CPT | Mod: GP

## 2021-02-25 PROCEDURE — 97166 OT EVAL MOD COMPLEX 45 MIN: CPT | Mod: GO

## 2021-02-25 PROCEDURE — 99232 SBSQ HOSP IP/OBS MODERATE 35: CPT | Performed by: INTERNAL MEDICINE

## 2021-02-25 PROCEDURE — 97530 THERAPEUTIC ACTIVITIES: CPT | Mod: GP

## 2021-02-25 PROCEDURE — 25800000 HC PHARMACY IV SOLUTIONS: Performed by: STUDENT IN AN ORGANIZED HEALTH CARE EDUCATION/TRAINING PROGRAM

## 2021-02-25 PROCEDURE — 0QP004Z REMOVAL OF INTERNAL FIXATION DEVICE FROM LUMBAR VERTEBRA, OPEN APPROACH: ICD-10-PCS | Performed by: ORTHOPAEDIC SURGERY

## 2021-02-25 PROCEDURE — 63600000 HC DRUGS/DETAIL CODE: Performed by: STUDENT IN AN ORGANIZED HEALTH CARE EDUCATION/TRAINING PROGRAM

## 2021-02-25 PROCEDURE — 0QH004Z INSERTION OF INTERNAL FIXATION DEVICE INTO LUMBAR VERTEBRA, OPEN APPROACH: ICD-10-PCS | Performed by: ORTHOPAEDIC SURGERY

## 2021-02-25 PROCEDURE — 0SG1071 FUSION OF 2 OR MORE LUMBAR VERTEBRAL JOINTS WITH AUTOLOGOUS TISSUE SUBSTITUTE, POSTERIOR APPROACH, POSTERIOR COLUMN, OPEN APPROACH: ICD-10-PCS | Performed by: ORTHOPAEDIC SURGERY

## 2021-02-25 PROCEDURE — 12000000 HC ROOM AND CARE MED/SURG

## 2021-02-25 PROCEDURE — 80048 BASIC METABOLIC PNL TOTAL CA: CPT | Performed by: STUDENT IN AN ORGANIZED HEALTH CARE EDUCATION/TRAINING PROGRAM

## 2021-02-25 PROCEDURE — 63700000 HC SELF-ADMINISTRABLE DRUG: Performed by: STUDENT IN AN ORGANIZED HEALTH CARE EDUCATION/TRAINING PROGRAM

## 2021-02-25 PROCEDURE — 4A11X4G MONITORING OF PERIPHERAL NERVOUS ELECTRICAL ACTIVITY, INTRAOPERATIVE, EXTERNAL APPROACH: ICD-10-PCS | Performed by: ORTHOPAEDIC SURGERY

## 2021-02-25 PROCEDURE — 0SG10AJ FUSION OF 2 OR MORE LUMBAR VERTEBRAL JOINTS WITH INTERBODY FUSION DEVICE, POSTERIOR APPROACH, ANTERIOR COLUMN, OPEN APPROACH: ICD-10-PCS | Performed by: ORTHOPAEDIC SURGERY

## 2021-02-25 PROCEDURE — 1123F ACP DISCUSS/DSCN MKR DOCD: CPT | Performed by: INTERNAL MEDICINE

## 2021-02-25 PROCEDURE — 85027 COMPLETE CBC AUTOMATED: CPT | Performed by: STUDENT IN AN ORGANIZED HEALTH CARE EDUCATION/TRAINING PROGRAM

## 2021-02-25 PROCEDURE — 0ST20ZZ RESECTION OF LUMBAR VERTEBRAL DISC, OPEN APPROACH: ICD-10-PCS | Performed by: ORTHOPAEDIC SURGERY

## 2021-02-25 PROCEDURE — 25000000 HC PHARMACY GENERAL: Performed by: STUDENT IN AN ORGANIZED HEALTH CARE EDUCATION/TRAINING PROGRAM

## 2021-02-25 PROCEDURE — 97530 THERAPEUTIC ACTIVITIES: CPT | Mod: GO

## 2021-02-25 RX ORDER — AMOXICILLIN 250 MG
1 CAPSULE ORAL 2 TIMES DAILY
Qty: 60 TABLET | Refills: 0 | COMMUNITY
Start: 2021-02-25 | End: 2021-03-27

## 2021-02-25 RX ORDER — RALOXIFENE HYDROCHLORIDE 60 MG/1
60 TABLET, FILM COATED ORAL DAILY
Qty: 30 TABLET | Refills: 0 | COMMUNITY
Start: 2021-02-25

## 2021-02-25 RX ORDER — POLYETHYLENE GLYCOL 3350 17 G/17G
17 POWDER, FOR SOLUTION ORAL DAILY
Qty: 3 PACKET | Refills: 0 | COMMUNITY
Start: 2021-02-26 | End: 2021-03-01

## 2021-02-25 RX ADMIN — OXYCODONE HYDROCHLORIDE 10 MG: 5 TABLET ORAL at 05:11

## 2021-02-25 RX ADMIN — OXYCODONE HYDROCHLORIDE 10 MG: 5 TABLET ORAL at 13:17

## 2021-02-25 RX ADMIN — ACETAMINOPHEN 650 MG: 325 TABLET, FILM COATED ORAL at 05:08

## 2021-02-25 RX ADMIN — ACETAMINOPHEN 650 MG: 325 TABLET, FILM COATED ORAL at 23:43

## 2021-02-25 RX ADMIN — DOCUSATE SODIUM AND SENNOSIDES 1 TABLET: 8.6; 5 TABLET, FILM COATED ORAL at 20:42

## 2021-02-25 RX ADMIN — DOCUSATE SODIUM AND SENNOSIDES 1 TABLET: 8.6; 5 TABLET, FILM COATED ORAL at 08:28

## 2021-02-25 RX ADMIN — OXYCODONE HYDROCHLORIDE 10 MG: 5 TABLET ORAL at 17:34

## 2021-02-25 RX ADMIN — OXYCODONE HYDROCHLORIDE 10 MG: 5 TABLET ORAL at 21:48

## 2021-02-25 RX ADMIN — DIAZEPAM 10 MG: 5 TABLET ORAL at 15:50

## 2021-02-25 RX ADMIN — DIAZEPAM 10 MG: 5 TABLET ORAL at 07:53

## 2021-02-25 RX ADMIN — OXYCODONE HYDROCHLORIDE 10 MG: 5 TABLET ORAL at 09:34

## 2021-02-25 RX ADMIN — POLYETHYLENE GLYCOL 3350 17 G: 17 POWDER, FOR SOLUTION ORAL at 08:28

## 2021-02-25 RX ADMIN — ACETAMINOPHEN 650 MG: 325 TABLET, FILM COATED ORAL at 11:38

## 2021-02-25 RX ADMIN — CEFAZOLIN SODIUM 2 G: 10 POWDER, FOR SOLUTION INTRAVENOUS at 13:17

## 2021-02-25 RX ADMIN — ACETAMINOPHEN 650 MG: 325 TABLET, FILM COATED ORAL at 17:34

## 2021-02-25 RX ADMIN — OXYCODONE HYDROCHLORIDE 10 MG: 5 TABLET ORAL at 00:59

## 2021-02-25 RX ADMIN — CEFAZOLIN SODIUM 2 G: 10 POWDER, FOR SOLUTION INTRAVENOUS at 04:58

## 2021-02-25 RX ADMIN — SODIUM CHLORIDE: 9 INJECTION, SOLUTION INTRAVENOUS at 00:57

## 2021-02-25 RX ADMIN — CEFAZOLIN SODIUM 2 G: 10 POWDER, FOR SOLUTION INTRAVENOUS at 20:43

## 2021-02-25 RX ADMIN — PANTOPRAZOLE SODIUM 20 MG: 20 TABLET, DELAYED RELEASE ORAL at 08:28

## 2021-02-25 RX ADMIN — ACETAMINOPHEN 650 MG: 325 TABLET, FILM COATED ORAL at 00:58

## 2021-02-25 ASSESSMENT — COGNITIVE AND FUNCTIONAL STATUS - GENERAL
WALKING IN HOSPITAL ROOM: 3 - A LITTLE
STANDING UP FROM CHAIR USING ARMS: 2 - A LOT
CLIMB 3 TO 5 STEPS WITH RAILING: 2 - A LOT
AFFECT: WFL;ANXIOUS
TOILETING: 3 - A LITTLE
MOVING TO AND FROM BED TO CHAIR: 3 - A LITTLE
HELP NEEDED FOR PERSONAL GROOMING: 4 - NONE
AFFECT: WFL;ANXIOUS
DRESSING REGULAR LOWER BODY CLOTHING: 2 - A LOT
HELP NEEDED FOR BATHING: 3 - A LITTLE
DRESSING REGULAR UPPER BODY CLOTHING: 3 - A LITTLE
EATING MEALS: 4 - NONE

## 2021-02-25 NOTE — PROGRESS NOTES
Patient: Zaria Peck  Location: [facility] 4B 4219  MRN: [id number]  Today's date: 2/25/2021     Session ended with pt seated in recliner, alarmed with all immediate needs in reach. VSS/NAD. RN notified.       Zaria is a 71 y.o. female admitted on 2/24/2021 with Status post lumbar spine surgery for decompression of spinal cord. Principal problem is Status post lumbar spine surgery for decompression of spinal cord.    Past Medical History  Zaria has a past medical history of Anxiety, Left bundle branch block (LBBB), Lumbar radiculopathy, Lumbar stenosis, Osteoporosis, and Use of cane as ambulatory aid.    History of Present Illness   s/p L2-5 Posterior Lumbar Decompression, Posterior Lumbar Interbody Fusion, Instrumentation, Cage, Allograft, Autograft (N/A Spine Lumbar)  Dr. Singh 2/24/21    OT Vitals    Date/Time Pulse SpO2 Pt Activity O2 Therapy BP BP Location BP Method Pt Position Observations [facility]   02/25/21 0918 79 96 % At rest None (Room air) 131/66 Right upper arm Automatic Lying PT/OT    02/25/21 0919 78 98 % At rest None (Room air) 136/70 Left upper arm Automatic Sitting PT/OT DD      OT Pain    Date/Time Pain Type Pref Pain Scale Location Rating: Rest Rating: Activity Interventions [facility]   02/25/21 0918 Pain Assessment number (Numeric Rating Pain Scale) back 8 9 position adjusted;care clustered    02/25/21 0919 Pain Assessment number (Numeric Rating Pain Scale) back 8 9 position adjusted;care clustered    02/25/21 0934 Pain Assessment number (Numeric Rating Pain Scale) -- 9 9 -- IMF          Prior Living Environment      Most Recent Value   Living Arrangements  house   Living Environment Comment  pt lives in bilevel home with 6 steps to main level with B HRs, 6 steps downstairs to laundry with no HR, tub shower, 5+5 KIMBERLY through garage to main level. pt reports son and grandkids are able to assist at discharge.          Prior Level of Function      Most Recent Value   Ambulation   assistive equipment   Transferring  assistive equipment   Toileting  independent   Bathing  independent   Dressing  independent   Eating  independent   Prior Level of Function Comment  pt reports occasionally using cane for ambulation, works full time in accounting          Occupational Profile      Most Recent Value   Reason for Services/Referral  ADL dysfunction   Successful Occupations  IND w/ ADL/IADL PTA   Occupational History/Life Experiences  works [employer] as an    Performance Patterns  Mother,  Grandmother   Environmental Supports and Barriers  supportive son and grandchilder to assist at d/c   Patient Goals  To decrease pain          OT Evaluation and Treatment - 02/25/21 0917        Time Calculation    Start Time  0917     Stop Time  0945     Time Calculation (min)  28 min        Session Details    Document Type  initial evaluation     Mode of Treatment  occupational therapy        General Information    Patient Profile Reviewed?  yes     Onset of Illness/Injury or Date of Surgery  02/24/21     Referring Physician  Francisco     General Observations of Patient  Pt rec'd supine in bed; agreeable to session     Existing Precautions/Restrictions  fall;spinal;other (see comments)    HOB elevated       Cognition/Psychosocial    Affect/Mental Status (Cognitive)  WFL;anxious     Orientation Status (Cognition)  oriented x 4     Follows Commands (Cognition)  WFL     Cognitive Function (Cognitive)  WFL     Comment, Cognition  pleasant and cooperative; anxious about mobility        Hearing Assessment    Hearing Status  WFL        Vision Assessment/Intervention    Visual Impairment/Limitations  corrective lenses full time        Sensory Assessment (Somatosensory)    Sensory Assessment (Somatosensory)  UE sensation intact        Range of Motion (ROM)    Range of Motion  ROM is WFL;bilateral upper extremities        Strength (Manual Muscle Testing)    Strength (Manual Muscle Testing)  strength is WFL;bilateral  upper extremities        Bed Mobility    [place], Supine to Sit  minimum assist (75% or more patient effort);1 person assist;increased time to complete;verbal cues     Verbal Cues (Supine to Sit)  technique;safety;preparatory posture     Assistive Device (Bed Mobility)  none     Comment (Bed Mobility)  OOB to the L; educated pt on log roll technique; assist needed to lift trunk off bed        Transfers    Transfers  stand pivot transfer     Comment  Pt is Min Ax2 for STS and Min Ax1 for stand pivot tx w/ RW        Sit to Stand Transfer    [place], Sit to Stand Transfer  minimum assist (75% or more patient effort);2 person assist;increased time to complete;verbal cues     Verbal Cues  hand placement;safety;technique;proper use of assistive device     Assistive Device  walker, front-wheeled     Comment  From EOB        Stand to Sit Transfer    [place], Stand to Sit Transfer  minimum assist (75% or more patient effort);verbal cues;1 person assist     Verbal Cues  hand placement;technique;safety;proper use of assistive device     Assistive Device  walker, front-wheeled     Comment  To recliner        Stand Pivot Transfer    [place], Stand Pivot/Stand Step Transfer  minimum assist (75% or more patient effort);verbal cues     Verbal Cues  hand placement;safety;technique;proper use of assistive device     Assistive Device  walker, front-wheeled     Comment  To recliner        Safety Issues, Functional Mobility    Impairments Affecting Function (Mobility)  balance;endurance/activity tolerance;pain;strength        Balance    Balance Assessment  sitting static balance;sitting dynamic balance;sit to stand dynamic balance;standing static balance;standing dynamic balance     Static Sitting Balance  WFL;sitting in chair     Dynamic Sitting Balance  mild impairment;sitting in chair     Sit to Stand Dynamic Balance  mild impairment;supported;standing     Static Standing Balance  mild  impairment;supported;standing     Dynamic Standing Balance  mild impairment;supported;standing        Lower Body Dressing    Self-Performance  dons/doffs left sock;dons/doffs right sock     [place] Assistance  dons/doffs left sock;dons/doffs right sock     [place]  minimum assist (75% or more patient effort);verbal cues;increased time to complete     Position  edge of bed sitting     Adaptive Equipment  none     Comment  Pt utilizing cross leg technique; increased time to complete w/ c/o increased pain; may benefit from use of LB dressing AE        BADL Safety/Performance    Impairments, BADL Safety/Performance  balance;endurance/activity tolerance;pain     Skilled BADL Treatment/Intervention  adaptive equipment training;BADL process/adaptation training;compensatory training;energy conservation;environmental modifications        Coping    Observed Emotional State  accepting;cooperative     Verbalized Emotional State  acceptance        AM-PAC (TM) - ADL (Current Function)    Putting on and taking off regular lower body clothing?  2 - A Lot     Bathing?  3 - A Little     Toileting?  3 - A Little     Putting on/taking off regular upper body clothing?  3 - A Little     How much help for taking care of personal grooming?  4 - None     Eating meals?  4 - None     AM-PAC (TM) ADL Score  19        Therapy Assessment/Plan (OT)    Rehab Potential (OT)  good, to achieve stated therapy goals     Therapy Frequency (OT)  3-5 times/wk        Progress Summary (OT)    Daily Outcome Statement (OT)  OT IE completed ([facility] 19). Pt limited by 8-9/10 pain in back, and decreased balance, endurance, and strength. Pt currently Min Ax1 for bed mobility, Min Ax1-2 for stand/pivot tx w/ RW, and Min A  for LB self care. Pt benefits from continued OT to Max functional IND and ensure safety.     Symptoms Noted During/After Treatment  increased pain        Therapy Plan Review/Discharge Plan (OT)    OT Recommended Discharge Disposition  skilled  nursing facility;other (see comments)    vs Home w/ Assist pending progress    Anticipated Equipment Needs At Discharge (OT)  commode, 3-in-1;dressing equipment;walker, front-wheeled;tub bench                   Education provided this session. See the Patient Education summary report for full details.         OT Goals      Most Recent Value   Bed Mobility Goal 1   Activity/Assistive Device  bed mobility activities, all at 02/25/2021 0917   [place]lie  modified independence at 02/25/2021 0917   Time Frame  by discharge at 02/25/2021 0917   Progress/Outcome  goal ongoing at 02/25/2021 0917   Transfer Goal 1   Activity/Assistive Device  all transfers at 02/25/2021 0917   [place]  modified independence at 02/25/2021 0917   Time Frame  by discharge at 02/25/2021 0917   Progress/Outcome  goal ongoing at 02/25/2021 0917   Bathing Goal 1   Activity/Assistive Device  bathing skills, all at 02/25/2021 0917   [place]lie  modified independence at 02/25/2021 0917   Time Frame  by discharge at 02/25/2021 0917   Progress/Outcome  goal ongoing at 02/25/2021 0917   Dressing Goal 1   Activity/Adaptive Equipment  dressing skills, all at 02/25/2021 0917   [place]  modified independence at 02/25/2021 0917   Time Frame  by discharge at 02/25/2021 0917   Progress/Outcome  goal ongoing at 02/25/2021 0917   Toileting Goal 1   Activity/Assistive Device  toileting skills, all at 02/25/2021 0917   [place]  modified independence at 02/25/2021 0917   Time Frame  by discharge at 02/25/2021 0917   Progress/Outcome  goal ongoing at 02/25/2021 0917

## 2021-02-25 NOTE — PROGRESS NOTES
Orthopedic Progress Note    Subjective     Pt seen and examined at bedside, resting comfortably. No acute events overnight. Pain well controlled, she says this is best sleep she has had in months. Pain in legs feels much better.     Objective   Temp:  [36.6 °C (97.8 °F)-37.3 °C (99.1 °F)] 36.7 °C (98.1 °F)  Heart Rate:  [74-84] 75  Resp:  [10-20] 12  BP: (139-185)/(67-95) 159/81  SpO2:  [95 %-100 %] 100 %    General: AVSS, NAD    Spine:   Dressing C/D/I  Appropriately TTP    RLE  Skin intact  No pain with ROM of ankle/toes  SILT in sural/saphenous/DP/SP/tibial distributions  Motor intact to quad/hamstrings/EHL/FHL/TA/peroneals/GSC  Pulses 2+    LLE  Skin intact  No pain with ROM of ankle/toes  SILT in sural/saphenous/DP/SP/tibial distributions  Motor intact to quad/hamstrings/EHL/FHL/TA/peroneals/GSC  Pulses 2+    Assessment   71F S/P L2-5 PLIF on 2/24 by Dr. Francisco Carroll DC'd this morning  Pain control  Ancef q8 while drain in  WBAT all extremities  DVT PPX: SCDs  PT/OT  AM LABS  Dispo planning    Alexandro Snell DO, PGY-2  Orthopedic Surgery  Pager: [phone]

## 2021-02-25 NOTE — PATIENT CARE CONFERENCE
Care Progression Rounds Note  Date: 2/25/2021  Time: 10:23 AM     Patient Name: Zaria Peck     Medical Record Number: [id number]   YOB: 1950  Sex: Female      Room/Bed: 4219    Admitting Diagnosis: Lumbar radiculitis [M54.16]  Spinal stenosis of lumbar region with neurogenic claudication [M48.062]  Status post lumbar spine surgery for decompression of spinal cord [Z98.890]   Admit Date/Time: 2/24/2021  8:58 AM    Primary Diagnosis: Status post lumbar spine surgery for decompression of spinal cord  Principal Problem: Status post lumbar spine surgery for decompression of spinal cord    GMLOS: pending  Anticipated Discharge Date: 2/25/2021    AM-PAC  Mobility Score: 16    Discharge Planning:  Living Arrangements: house    Barriers to Discharge:  Barriers to Discharge: Medical issues not resolved  Comment: lumbar decompression    Participants:  social work/services, nursing

## 2021-02-25 NOTE — PLAN OF CARE
Problem: Adult Inpatient Plan of Care  Goal: Plan of Care Review  Outcome: Progressing  Flowsheets (Taken 2/25/2021 1024)  Progress: improving  Plan of Care Reviewed With: patient  Outcome Summary: OT IE completed ([facility] 19). Pt limited by 8-9/10 pain in back, and decreased balance, endurance, and strength. Pt currently Min Ax1 for bed mobility, Min Ax1-2 for stand/pivot tx w/ RW, and Min A  for LB self care. Pt benefits from continued OT to Max functional IND and ensure safety.

## 2021-02-25 NOTE — PLAN OF CARE
Problem: Adult Inpatient Plan of Care  Goal: Plan of Care Review  Outcome: Progressing  Flowsheets (Taken 2/25/2021 1006)  Progress: no change  Plan of Care Reviewed With: patient  Outcome Summary: PT eval completed. pt requires min A for bed mobility, min A x1-2 for transfers, and min Ax1 to ambulate short distance in room. pt is limited by decreased balance, strength, and increased back pain with mobility. demonstrates decreased gait speed and step length while ambulating. recommend continued PT services to maximize function and safety prior to discharge. [facility] 16/24.

## 2021-02-25 NOTE — HOSPITAL COURSE
Zaria is a 71 y.o. female admitted on 2/24/2021 with Status post lumbar spine surgery for decompression of spinal cord. Principal problem is Status post lumbar spine surgery for decompression of spinal cord.    Past Medical History  Zaria has a past medical history of Anxiety, Left bundle branch block (LBBB), Lumbar radiculopathy, Lumbar stenosis, Osteoporosis, and Use of cane as ambulatory aid.    History of Present Illness   s/p L2-5 Posterior Lumbar Decompression, Posterior Lumbar Interbody Fusion, Instrumentation, Cage, Allograft, Autograft (N/A Spine Lumbar)  Dr. Singh 2/24/21

## 2021-02-25 NOTE — PROGRESS NOTES
Patient: Zaria Peck  Location: [facility] 4B 4219  MRN: [id number]  Today's date: 2/25/2021    Pt was left seated in chair with alarm on, call bell in reach, NAD, and all needs met. RN notified.    Zaria is a 71 y.o. female admitted on 2/24/2021 with Status post lumbar spine surgery for decompression of spinal cord. Principal problem is Status post lumbar spine surgery for decompression of spinal cord.    Past Medical History  Zaria has a past medical history of Anxiety, Left bundle branch block (LBBB), Lumbar radiculopathy, Lumbar stenosis, Osteoporosis, and Use of cane as ambulatory aid.    History of Present Illness   s/p L2-5 Posterior Lumbar Decompression, Posterior Lumbar Interbody Fusion, Instrumentation, Cage, Allograft, Autograft (N/A Spine Lumbar)  Dr. Singh 2/24/21    PT Vitals    Date/Time Pulse SpO2 Pt Activity O2 Therapy BP BP Location BP Method Pt Position Observations [facility]   02/25/21 0918 79 96 % At rest None (Room air) 131/66 Right upper arm Automatic Lying PT/OT    02/25/21 0919 78 98 % At rest None (Room air) 136/70 Left upper arm Automatic Sitting PT/OT DD      PT Pain    Date/Time Pain Type Pref Pain Scale Location Rating: Rest Rating: Activity Interventions [facility]   02/25/21 0918 Pain Assessment number (Numeric Rating Pain Scale) back 8 9 position adjusted;care clustered    02/25/21 0919 Pain Assessment number (Numeric Rating Pain Scale) back 8 9 position adjusted;care clustered    02/25/21 0934 Pain Assessment number (Numeric Rating Pain Scale) -- 9 9 -- IMF          Prior Living Environment      Most Recent Value   Living Arrangements  house   Living Environment Comment  pt lives in bilevel home with 6 steps to main level with B HRs, 6 steps downstairs to laundry with no HR, tub shower, 5+5 KIMBERLY through garage to main level. pt reports son and grandkids are able to assist at discharge.          Prior Level of Function      Most Recent Value   Ambulation  assistive equipment    Transferring  assistive equipment   Toileting  independent   Bathing  independent   Dressing  independent   Eating  independent   Prior Level of Function Comment  pt reports occasionally using cane for ambulation, works full time in accounting          PT Evaluation and Treatment - 02/25/21 0918        Time Calculation    Start Time  0918     Stop Time  0945     Time Calculation (min)  27 min        Session Details    Document Type  initial evaluation     Mode of Treatment  physical therapy        General Information    Patient Profile Reviewed?  yes     Onset of Illness/Injury or Date of Surgery  02/24/21     Referring Physician  Francisco     General Observations of Patient  pt presents supine in bed, agreeable to therapy.      Existing Precautions/Restrictions  fall;spinal;other (see comments)    HOB elevated       Cognition/Psychosocial    Affect/Mental Status (Cognitive)  WFL;anxious     Orientation Status (Cognition)  oriented x 4     Follows Commands (Cognition)  WFL     Comment, Cognition  pleasant and cooperative, slightly anxious about mobility/pain        Sensory Assessment (Somatosensory)    Sensory Assessment (Somatosensory)  LE sensation intact        Range of Motion (ROM)    Range of Motion  ROM is WFL;bilateral lower extremities        Range of Motion Comprehensive    Comment: Range of Motion  hip flexion not formally tested secondary to surgery        Strength (Manual Muscle Testing)    Strength (Manual Muscle Testing)  left lower extremity strength deficit;right lower extremity strength deficit     Left Lower Extremity Strength  knee;ankle     Knee, Left (Strength)  3+/5 seated knee extension     Ankle, Left (Strength)  WFL     Right Lower Extremity Strength  knee;ankle     Knee, Right (Strength)  4-/5 seated knee extension     Ankle, Right (Strength)  WFL        Strength Comprehensive (MMT)    Comment  hip flexion not formally tested secondary to surgery        Bed Mobility    [place], Supine to  Sit  minimum assist (75% or more patient effort);verbal cues     Verbal Cues (Supine to Sit)  technique;maintaining precautions     Assistive Device (Bed Mobility)  none     Comment (Bed Mobility)  to L side of bed cues provided for technique, hand placement. pt requires assist to bring trunk into upright position.        Sit to Stand Transfer    [place], Sit to Stand Transfer  minimum assist (75% or more patient effort);2 person assist;verbal cues     Verbal Cues  hand placement;safety;technique;maintaining precautions     Assistive Device  walker, front-wheeled     Comment  from EOB. cues for technique and hand placement, pt initially attempting to pull from walker. receptive to cues/education.        Stand to Sit Transfer    [place], Stand to Sit Transfer  minimum assist (75% or more patient effort);verbal cues     Verbal Cues  hand placement;safety;technique;maintaining precautions     Assistive Device  walker, front-wheeled     Comment  to chair        Gait Training    [place], Gait  minimum assist (75% or more patient effort);verbal cues     Assistive Device  walker, front-wheeled     Distance in Feet  8 feet     Gait Pattern Utilized  step-to;step-through     Deviations/Abnormal Patterns (Gait)  step length decreased;stride length decreased;gait speed decreased;antalgic     Comment  pt was able to ambulate short distance in room with min A and RW. limited by increased pain with mobility, requires cues for positioning of RW during ambulation. demonstrates decreased speed, guarded gait due to pain.        Stairs Training    [place], Stairs  unable to assess    limited by pain    Comment  will assess at future session pending progress        Safety Issues, Functional Mobility    Impairments Affecting Function (Mobility)  balance;endurance/activity tolerance;pain;range of motion;strength        Balance    Balance Assessment  sitting static balance;sit to stand dynamic balance;standing static  balance;standing dynamic balance     Static Sitting Balance  WFL;sitting in chair     Sit to Stand Dynamic Balance  mild impairment;supported    RW    Static Standing Balance  mild impairment;supported    RW    Dynamic Standing Balance  mild impairment;supported    RW    Comment, Balance  requires min A and RW for stability. decreased gait speed noted. requires cuse for positioning of RW.        AM-PAC (TM) - Mobility (Current Function)    Turning from your back to your side while in a flat bed without using bedrails?  3 - A Little     Moving from lying on your back to sitting on the side of a flat bed without using bedrails?  3 - A Little     Moving to and from a bed to a chair?  3 - A Little     Standing up from a chair using your arms?  2 - A Lot     To walk in a hospital room?  3 - A Little     Climbing 3-5 steps with a railing?  2 - A Lot     AM-PAC (TM) Mobility Score  16        Therapy Assessment/Plan (PT)    Rehab Potential (PT)  good, to achieve stated therapy goals     Therapy Frequency (PT)  5 times/wk        Progress Summary (PT)    Daily Outcome Statement (PT)  PT eval completed. pt requires min A for bed mobility, min A x1-2 for transfers, and min Ax1 to ambulate short distance in room. pt is limited by decreased balance, strength, and increased back pain with mobility. demonstrates decreased gait speed and step length while ambulating. recommend continued PT services to maximize function and safety prior to discharge. [facility] 16/24.     Symptoms Noted During/After Treatment  increased pain        Therapy Plan Review/Discharge Plan (PT)    PT Recommended Discharge Disposition  home with assist;home with home health;skilled nursing facility    home with 24 hr assist vs. SNF pending progress    Anticipated Equipment Needs at Discharge (PT Eval)  none    has walker, cane                      Education provided this session. See the Patient Education summary report for full details.    PT Goals      Most  Recent Value   Bed Mobility Goal 1   Activity/Assistive Device  rolling to left, rolling to right, sit to supine/supine to sit at 02/25/2021 0918   [place]  independent at 02/25/2021 0918   Time Frame  by discharge at 02/25/2021 0918   Progress/Outcome  goal ongoing at 02/25/2021 0918   Transfer Goal 1   Activity/Assistive Device  sit-to-stand/stand-to-sit, bed-to-chair/chair-to-bed, walker, front-wheeled [LRAD] at 02/25/2021 0918   [place]  modified independence at 02/25/2021 0918   Time Frame  by discharge at 02/25/2021 0918   Progress/Outcome  goal ongoing at 02/25/2021 0918   Gait Training Goal 1   Activity/Assistive Device  gait (walking locomotion), walker, front-wheeled [LRAD] at 02/25/2021 0918   [place]  modified independence at 02/25/2021 0918   Distance  100 at 02/25/2021 0918   Time Frame  by discharge at 02/25/2021 0918   Progress/Outcome  goal ongoing at 02/25/2021 0918   Stairs Goal 1   Activity/Assistive Device  ascending stairs, descending stairs at 02/25/2021 0918   [place]  modified independence at 02/25/2021 0918   Number of Stairs  6 at 02/25/2021 0918   Time Frame  by discharge at 02/25/2021 0918   Progress/Outcome  goal ongoing at 02/25/2021 0918

## 2021-02-25 NOTE — PROGRESS NOTES
Hospital Medicine Service -  Daily Progress Note       SUBJECTIVE   Interval History: No events overnight, + OOB to chair with PT/OT today. Still with some lower back pain, but no new complaints. No CP, sOB, abd pain, N/V. No urination or BM yet, had witt removed earlier today.      OBJECTIVE      Vital signs in last 24 hours:  Temp:  [36.6 °C (97.8 °F)-37 °C (98.6 °F)] 37 °C (98.6 °F)  Heart Rate:  [71-84] 71  Resp:  [10-20] 18  BP: (121-185)/(61-95) 121/61    Intake/Output Summary (Last 24 hours) at 2/25/2021 1136  Last data filed at 2/25/2021 0650  Gross per 24 hour   Intake 2916.67 ml   Output 2320 ml   Net 596.67 ml       PHYSICAL EXAMINATION      Physical Exam  Vitals signs and nursing note reviewed.   Constitutional:       General: She is not in acute distress.     Appearance: Normal appearance. She is not ill-appearing, toxic-appearing or diaphoretic.   HENT:      Head: Normocephalic and atraumatic.      Right Ear: External ear normal.      Left Ear: External ear normal.      Nose: Nose normal.   Eyes:      General: No scleral icterus.        Right eye: No discharge.         Left eye: No discharge.      Pupils: Pupils are equal, round, and reactive to light.   Neck:      Musculoskeletal: Normal range of motion. No neck rigidity.   Cardiovascular:      Rate and Rhythm: Normal rate.      Pulses: Normal pulses.      Heart sounds: Normal heart sounds.   Pulmonary:      Effort: Pulmonary effort is normal.      Breath sounds: Normal breath sounds.   Abdominal:      General: Abdomen is flat. Bowel sounds are normal.      Palpations: Abdomen is soft.   Musculoskeletal:         General: No swelling, tenderness, deformity or signs of injury.      Right lower leg: No edema.      Left lower leg: No edema.      Comments: + L spine drain and limited ROM due to pain.      Skin:     General: Skin is warm.      Coloration: Skin is not jaundiced or pale.      Findings: No bruising, erythema, lesion or rash.    Neurological:      General: No focal deficit present.      Mental Status: She is alert and oriented to person, place, and time. Mental status is at baseline.   Psychiatric:         Mood and Affect: Mood normal.         Behavior: Behavior normal.         Thought Content: Thought content normal.         Judgment: Judgment normal.            LINES, CATHETERS, DRAINS, AIRWAYS, AND WOUNDS   Lines, Drains, and Airways:  Wounds (agree with documentation and present on admission):  Peripheral IV 02/24/21 Left;Posterior Wrist (Active)   Number of days: 1       Drain 1 Right;Lower;Medial Back 10 Fr. (Active)   Number of days: 1       Surgical Incision Back (Active)   Number of days: 1         Comments:      LABS / IMAGING / TELE      Labs  I have reviewed the patient's labs to the time of note. No new clinical concern.    SARS CoV 2 RNA (no units)   Date/Time Value   02/19/2021 0852 NOT DETECTED     No new imaging or cardio studies.       ASSESSMENT AND PLAN      LBBB (left bundle branch block)  Assessment & Plan  Stable, no new changes on EkG.       Gastroesophageal reflux disease without esophagitis  Assessment & Plan  Stable, cont with po meds, diet as tolerated.       Essential hypertension  Assessment & Plan  BP stable, cont with prn meds if needed, low salt diet.       * Status post lumbar spine surgery for decompression of spinal cord  Assessment & Plan  Progressing well, has drain in place  Cont with pain control  Monitor for urine output and BM (both negative thus far postop)  Cont with PT/OT evaluations, Orhto f/u  DVT proph           VTE Assessment: Padua    VTE Prophylaxis:    Code Status: Full Code      Estimated Discharge Date: 2/25/2021   Disposition Planning: DC when cleared by Ortho, PT/OT     Josesito Tillman MD  2/25/2021

## 2021-02-25 NOTE — ASSESSMENT & PLAN NOTE
Progressing well, has drain in place  Cont with pain control  Monitor for urine output and BM (both negative thus far postop)  Cont with PT/OT evaluations, Orhto f/u  DVT proph

## 2021-02-25 NOTE — CONSULTS
Consult Note         Hospital Medicine Service -  IP Medical Consult       CHIEF COMPLAINT     Postoperative management.     HISTORY OF PRESENT ILLNESS      71 y.o. female with a past medical history capital branch block, lumbar stenosis, lumbar radiculopathy, anxiety, hypertension, status post lumbar decompression and fusion.  Perioperative notes reviewed.  Pain is well controlled.  Hemodynamically stable.  Denies any symptoms.  Case discussed with nursing staff.    PAST MEDICAL AND SURGICAL HISTORY      Past Medical History:   Diagnosis Date   • Anxiety    • Left bundle branch block (LBBB)    • Lumbar radiculopathy    • Lumbar stenosis    • Osteoporosis    • Use of cane as ambulatory aid        Past Surgical History:   Procedure Laterality Date   • CHOLECYSTECTOMY     • EYE SURGERY      dettached retina left   • FOOT SURGERY Bilateral    • KNEE CARTILAGE SURGERY Bilateral        MEDICATIONS      Prior to Admission medications    Medication Sig Start Date End Date Taking? Authorizing Provider   acetaminophen (TYLENOL) 325 mg tablet Take by mouth every 6 (six) hours as needed for mild pain.   Yes ProviderJesse MD   diazePAM (VALIUM) 2 mg tablet Take 2 mg by mouth every 6 (six) hours as needed for anxiety.   Yes ProviderJesse MD   HYDROcodone-acetaminophen (NORCO) 7.5-325 mg per tablet Take 1 tablet by mouth every 6 (six) hours as needed for moderate pain.   Yes ProviderJesse MD   raloxifene (EVISTA) 60 mg tablet Take 60 mg by mouth daily.   Yes ProviderJesse MD       ALLERGIES      Patient has no known allergies.    FAMILY HISTORY      History reviewed. No pertinent family history.    SOCIAL HISTORY      Social History     Socioeconomic History   • Marital status: Single     Spouse name: None   • Number of children: None   • Years of education: None   • Highest education level: None   Occupational History   • None   Social Needs   • Financial resource strain: None   • Food insecurity      Worry: None     Inability: None   • Transportation needs     Medical: None     Non-medical: None   Tobacco Use   • Smoking status: Never Smoker   • Smokeless tobacco: Never Used   Substance and Sexual Activity   • Alcohol use: Yes     Alcohol/week: 3.0 standard drinks     Types: 3 Glasses of wine per week   • Drug use: Never   • Sexual activity: None   Lifestyle   • Physical activity     Days per week: None     Minutes per session: None   • Stress: None   Relationships   • Social connections     Talks on phone: None     Gets together: None     Attends [employer] service: None     Active member of club or organization: None     Attends meetings of clubs or organizations: None     Relationship status: None   • Intimate partner violence     Fear of current or ex partner: None     Emotionally abused: None     Physically abused: None     Forced sexual activity: None   Other Topics Concern   • None   Social History Narrative   • None       REVIEW OF SYSTEMS          As per HPI, otherwise comprehensive review of systems negative apart from pertinent positive discussed above.    PHYSICAL EXAMINATION      Temp:  [36.6 °C (97.8 °F)-37.3 °C (99.1 °F)] 36.7 °C (98.1 °F)  Heart Rate:  [74-84] 75  Resp:  [10-20] 12  BP: (139-185)/(67-95) 159/81  Body mass index is 25.69 kg/m².    General exam : appears age stated, well nourished, not in distress  Head: atraumatic, normocephalic  Eyes : PERRLA, EOMI, no pallor, no icterus  ENT: no lesions, oropharynx pink, mucous membranes moist   Neck: supple, no Lymph nodes, no Thyromegaly, no JVD   CVS : normal rate, normal rhythm, S1 and S2 heard, no murmurs, rubs or gallops  Resp:normal accessory muscle usage, clear to auscultation Bilaterally  Abdomen : soft, Nt, BS +, no organomegaly   Extremities : no edema, no cyanosis   MSK: no DJD, no joint swellings, no joint tenderness   Skin: intact, warm, no rash  Neuro: AAO x3, CN 2-12 intact,  motor strength in all extremities, sensations,  DTRs, coordination intact.  Psych: normal mood.cooperative      LABS / IMAGING / EKG        Labs  CBC Results       02/19/21 06/13/15 06/12/15                    0852 0346 0437         WBC 5.52 6.32 7.53         RBC 4.88 3.48 3.66         HGB 14.6 10.6 11.2         HCT 45.6 32.4 34.4         MCV 93.4 93.1 94.0         MCH 29.9 30.5 30.6         MCHC 32.0 32.7 32.6          131 139                     CMP Results       02/19/21 06/13/15 06/12/15                    0852 0346 0437          140 139         K 4.1 3.9 3.7         Cl 105 110 109         CO2 29 27 27         Glucose 108 91 104         BUN 27 12 17         Creatinine 0.8 0.6 0.8         Calcium 9.3 7.9 7.8         Anion Gap 9 3 3         EGFR >60.0 -- --                       Troponin I Results     No lab values to display.        Microbiology Results     ** No results found for the last 720 hours. **        UA Results     No lab values to display.          Imaging  X-RAY LUMBAR SPINE 2 OR 3 VIEWS   Final Result   IMPRESSION: Postoperative changes.      COMMENT: Portable AP and lateral views of the lumbar spine were performed in the   operating room.      Comparison is made to intraoperative images from 06/10/2015.      Previously identified posterior fusion at L4 and L5 has been extended cephalad   to the L3 and L2 levels.  Spacer devices have been placed at L2-L3 and L3-L4.   The L2-L3 space or devices eccentrically located anterior in the disc space.  A   spacer device is again seen at L4-L5.  Grade 1 anterior spondylolisthesis of L4   on L5 is again noted.  Surgical drains have been left in place.            ECG/Telemetry  reviewed by me    ASSESSMENT AND PLAN           * Status post lumbar spine surgery for decompression of spinal cord  Assessment & Plan  71-year-old female status post lumbar fusion and decompression.  Pain control.  Further management as per orthopedic team.  Patient is hemodynamically stable.  Chart  reviewed.  Perioperative notes reviewed.  Discussed with nursing staff.      LBBB (left bundle branch block)  Assessment & Plan  History of left bundle branch block.  Cardiac status is stable.  Preoperative notes reviewed.    Gastroesophageal reflux disease without esophagitis  Assessment & Plan  Protonix 40 mg p.o. daily.    Essential hypertension  Assessment & Plan  Elevated blood pressure noted secondary to pain.  IV hydralazine as needed basis.        VTE Assessment: Padua    VTE Prophylaxis Plan: @prophylaxis@  Code Status: Full Code       Angel Claudio MD  2/24/2021

## 2021-02-25 NOTE — PLAN OF CARE
Problem: Adult Inpatient Plan of Care  Goal: Readiness for Transition of Care  Intervention: Mutually Develop Transition Plan  Flowsheets (Taken 2/25/2021 1524)  Equipment Needed After Discharge: none  Assistive Device/Animal Currently Used at Home: cane, straight  Anticipated Changes Related to Illness: none  Transportation Concerns: car, none  Readmission Within the Last 30 Days: no previous admission in last 30 days  Patient/Family Anticipated Services at Transition: none  Patient/Family Anticipates Transition to: home with family  Transportation Anticipated: family or friend will provide     MSW CC met with pt. Pt states she lives at home with her grown son. She has a walker and a cane and lives in a bilevel home. MSW CC following to assist, as needed. Pt is interested in homecare if there is an agency by her home that can accept her. MSW CC following to see what agencies can follow pt for homecare at home. MSW CC following to assist further, as needed.    PLAN: dc home with homecare.    Addendum- 3:41 PM- Referral was made to [facility], as requested by the pt. Awaiting to see if they are a provider that goes to her area and if they can accept her as a pt.

## 2021-02-25 NOTE — OP NOTE
REPORT TYPE: Operative Note    DATE OF OPERATION: 02/24/2021    PREOPERATIVE DIAGNOSIS:  L2-L4 stenosis and disk herniation adjacent to a previous L4-L5 fusion.    POSTOPERATIVE DIAGNOSIS:  L2-L4 stenosis and disk herniation, adjacent to a previous L4-L5 fusion.    PROCEDURES:  Bilateral lumbar laminectomy L2-L4 with right-sided diskectomy L2-L3 and L3-L4, right-sided facetectomy at L2-L3 and L3-L4, posterior spinal fusion and transforaminal lumbar interbody fusion, L2-L3 and L3-L4 with use of posterior   instrumentation, placement of new pedicle screws bilaterally at L2 and L3, removal and replacement of left L4 pedicle screw use of existing pedicle screws right side at L4 and bilaterally at L5, use of interbody cages at L2-L3 and L3-L4; use of local   autograft bone, crushed cancellous allograft bone, and Bacterin bone matrix, exploration of spinal fusion at L4-L5.    SURGEON:  Addi Singh MD    ASSISTANT:  None.    ANESTHESIA:  General endotracheal anesthesia.    COMPLICATIONS:  None.    INSTRUMENTATION:  DePuy Expedium screws and Globus Caliber cages.    INDICATION:  For complete discussion of indication of procedure as well as discussion I had with the patient in my office regarding risks, benefits, alternatives to treatment, please refer to my office notes and the consent form, which was signed.    DESCRIPTION OF PROCEDURE:  The patient was identified in the holding area.  Operative site was marked.  The patient was taken to the operating room.  Neurophysiologic monitoring leads were applied.  General anesthesia was administered.  The patient was   then prepped and draped in a prone position on a Jame table.  All bony and soft tissue protuberances were well padded throughout the duration of the procedure.  Throughout the procedure, the patient had SCDs on her bilateral lower extremities.  Prior   to skin incision, intravenous antibiotics were administered and a formal timeout was performed.  At the end of  the procedure, sponge and needle counts were correct x2.    After prepping and draping, incision was made posteriorly to the L2-L5 levels in midline.  Dissection was carried down through subcutaneous tissue down to the level of the deep fascia.  Deep fascia was incised with electrocautery.  In subperiosteal   fashion, posterior elements of L2 to L5 were exposed including the previous instrumentation at L4-L5.  Soft tissue retractors were placed.  The previous caps and rods were removed bilaterally from the L4 and L5 screws.  The fusion mass was explored   posterolaterally at L4-L5 with electrocautery and 3-0 curved curette.  There was noted to be a solid posterolateral fusion at L4-L5.  The left L4 screw was removed to allow access to the posterolateral region for later use during the fusion portion of   procedure.  A large rongeur was then used to remove the spinous processes at L2 and L3.  A large rongeur was then used to thin the lamina at these levels.  A 3 mm Kerrison rongeur was then used to remove ligamentum flavum and perform laminectomy removing   the remainder of the L4 lamina and the entire L3 lamina and inferior two-thirds of L2 lamina.  Lateral recess decompression was performed using a 3 mm Kerrison rongeur, removing ligamentum flavum and performing a partial medial facetectomy bilaterally   at L2-L3 and L3-L4.  Bilateral foraminotomies were performed at L3-L4 using a 3 mm Kerrison rongeur.  An osteotome and a mallet was used to osteotomize through the right L3 and right L3 pars, right L2 a and right L3 inferior articular processes were   removed using large rongeur.  There was a very large extruded disk herniation on the right side at L2-L3 and L3-L4.  These were removed using straight pituitary rongeur.  Through the underlying annular defects at L2-L3 and L3-L4, the L2-L3 and L3-L4   interspaces were completely debrided free of all disk and cartilaginous material back to bleeding endplate bone using  a series of curved and straight curettes, pituitary rongeurs, and endplate dwayne.  The interspaces were irrigated and inspected to   ensure the bleeding bony endplates were exposed.  Local autograft bone from laminectomy was morcellized.  It was combined with crushed cancellous allograft bone, and Bacterin bone matrix.  This bone graft combination was impacted using tamp and mallet in   the L2-L3 and L3-L4 interspaces.  Additional amounts of this bone graft combination were placed in Globus Caliber cages of appropriate size.  The cages were impacted using a tamp and mallet in the L2-L3 and L3-L4 interspaces expanding up to appropriate   height appropriate height.  Secure fit was noted with each cage.  Attention was turned towards instrumentation.  Pedicles were identified from within the spinal canal bilaterally at L2 and L3.  A high speed bur was used to make starting point for the   pedicle screws followed by advancement of pedicle probe through the isthmus, the pedicle and vertebral bodies to appropriate depth.  The holes were tapped and felt with a ball-tipped probe to ensure there were no breaches.  Following this, screws of   appropriate width and length were placed bilaterally at L2 and L3 and a screw of slightly larger diameters were placed on the left side at L4.  Secure fixation was noted with each screw purchase.  All screws tested well with EMG without evidence of   medial breach.  Following this, the bilateral transverse processes at L2 and L3 and the bilateral fusion mass at L4 as well as the left sided facet joints at L2-L3 and L3-L4 were decorticated using a high-speed bur.  The remainder of the bone graft   combination was packed out with decorticated bony surfaces bilaterally at L3-L4.  Precut and contoured rods were placed in the screws bilaterally at L2-L5.  Caps were placed over the rods and the screws and finally tightened in place according to   's recommendations.   Intraoperative AP and lateral x-rays showed adequate positioning of instrumentation and interbody cages.  The adequacy of decompression was confirmed with a Mac[place], which was passed out the foramen along lateral recess   bilaterally at L3-L4.  There was no remaining stenosis or compression noted.  The exiting traversing nerves bilaterally at L2-L4 were fully decompressed.  Hemostasis was obtained using electrocautery, bipolar, and FloSeal.  Under Valsalva maneuver, there   was no significant bleeding noted.  No evidence of any spinal fluid leakage.  The wound was copiously irrigated with antibiotic solution.  Hemostasis was felt to be adequate.  A drain was placed deep to deep fascia.  Deep fascia was closed in   interrupted fashion.  Subcutaneous tissue was closed in interrupted fashion.  Skin was closed in running fashion followed by dry dressing and tape.  The patient was turned supine on the hospital bed, extubated, and transferred to the PACU in stable   condition.  There were no complications.      Addi Singh MD    DD: 02/24/2021 18:41  DT: 02/24/2021 18:43  Voice ID: [id number]/Report ID: [id number]  Sayra Maharaj

## 2021-02-25 NOTE — PROGRESS NOTES
Pt without new complaints, doing well  AFVSS PHUC in place  5/5 motor BLE all muscle groups  SILT BLE all sens dist  Dressing dry     A/p stable post op day 1  -pt, oob  -pain control  -abx/drain  -scds  -med management

## 2021-02-26 LAB
ANION GAP SERPL CALC-SCNC: 9 MEQ/L (ref 3–15)
BUN SERPL-MCNC: 16 MG/DL (ref 8–20)
CALCIUM SERPL-MCNC: 8.7 MG/DL (ref 8.9–10.3)
CHLORIDE SERPL-SCNC: 106 MEQ/L (ref 98–109)
CO2 SERPL-SCNC: 24 MEQ/L (ref 22–32)
CREAT SERPL-MCNC: 0.8 MG/DL (ref 0.6–1.1)
ERYTHROCYTE [DISTWIDTH] IN BLOOD BY AUTOMATED COUNT: 12.3 % (ref 11.7–14.4)
GFR SERPL CREATININE-BSD FRML MDRD: >60 ML/MIN/1.73M*2
GLUCOSE SERPL-MCNC: 94 MG/DL (ref 70–99)
HCT VFR BLDCO AUTO: 39.8 % (ref 35–45)
HGB BLD-MCNC: 13 G/DL (ref 11.8–15.7)
MCH RBC QN AUTO: 30 PG (ref 28–33.2)
MCHC RBC AUTO-ENTMCNC: 32.7 G/DL (ref 32.2–35.5)
MCV RBC AUTO: 91.7 FL (ref 83–98)
PDW BLD AUTO: 11.2 FL (ref 9.4–12.3)
PLATELET # BLD AUTO: 144 K/UL (ref 150–369)
POTASSIUM SERPL-SCNC: 4.1 MEQ/L (ref 3.6–5.1)
RBC # BLD AUTO: 4.34 M/UL (ref 3.93–5.22)
SODIUM SERPL-SCNC: 139 MEQ/L (ref 136–144)
WBC # BLD AUTO: 8.77 K/UL (ref 3.8–10.5)

## 2021-02-26 PROCEDURE — 80048 BASIC METABOLIC PNL TOTAL CA: CPT | Performed by: STUDENT IN AN ORGANIZED HEALTH CARE EDUCATION/TRAINING PROGRAM

## 2021-02-26 PROCEDURE — 63600000 HC DRUGS/DETAIL CODE: Performed by: NURSE PRACTITIONER

## 2021-02-26 PROCEDURE — 63700000 HC SELF-ADMINISTRABLE DRUG: Performed by: NURSE PRACTITIONER

## 2021-02-26 PROCEDURE — 97530 THERAPEUTIC ACTIVITIES: CPT | Mod: GP,CQ

## 2021-02-26 PROCEDURE — 25800000 HC PHARMACY IV SOLUTIONS: Performed by: STUDENT IN AN ORGANIZED HEALTH CARE EDUCATION/TRAINING PROGRAM

## 2021-02-26 PROCEDURE — 63700000 HC SELF-ADMINISTRABLE DRUG: Performed by: STUDENT IN AN ORGANIZED HEALTH CARE EDUCATION/TRAINING PROGRAM

## 2021-02-26 PROCEDURE — 36415 COLL VENOUS BLD VENIPUNCTURE: CPT | Performed by: STUDENT IN AN ORGANIZED HEALTH CARE EDUCATION/TRAINING PROGRAM

## 2021-02-26 PROCEDURE — 63600000 HC DRUGS/DETAIL CODE: Performed by: STUDENT IN AN ORGANIZED HEALTH CARE EDUCATION/TRAINING PROGRAM

## 2021-02-26 PROCEDURE — 25000000 HC PHARMACY GENERAL: Performed by: STUDENT IN AN ORGANIZED HEALTH CARE EDUCATION/TRAINING PROGRAM

## 2021-02-26 PROCEDURE — 97116 GAIT TRAINING THERAPY: CPT | Mod: GP,CQ

## 2021-02-26 PROCEDURE — 85027 COMPLETE CBC AUTOMATED: CPT | Performed by: STUDENT IN AN ORGANIZED HEALTH CARE EDUCATION/TRAINING PROGRAM

## 2021-02-26 PROCEDURE — 12000000 HC ROOM AND CARE MED/SURG

## 2021-02-26 PROCEDURE — 99232 SBSQ HOSP IP/OBS MODERATE 35: CPT | Performed by: INTERNAL MEDICINE

## 2021-02-26 RX ORDER — HYDROMORPHONE HYDROCHLORIDE 2 MG/1
2 TABLET ORAL EVERY 4 HOURS PRN
Qty: 40 TABLET | Refills: 0 | Status: SHIPPED | OUTPATIENT
Start: 2021-02-26 | End: 2021-03-05

## 2021-02-26 RX ORDER — HYDROMORPHONE HYDROCHLORIDE 2 MG/1
2-4 TABLET ORAL EVERY 4 HOURS PRN
Status: DISCONTINUED | OUTPATIENT
Start: 2021-02-26 | End: 2021-02-28 | Stop reason: HOSPADM

## 2021-02-26 RX ORDER — HYDROMORPHONE HYDROCHLORIDE 2 MG/1
2 TABLET ORAL EVERY 4 HOURS PRN
Status: DISCONTINUED | OUTPATIENT
Start: 2021-02-26 | End: 2021-02-26

## 2021-02-26 RX ORDER — DIAZEPAM 5 MG/1
5 TABLET ORAL EVERY 8 HOURS PRN
Qty: 20 TABLET | Refills: 0 | Status: SHIPPED | OUTPATIENT
Start: 2021-02-26 | End: 2021-03-05

## 2021-02-26 RX ORDER — DEXAMETHASONE SODIUM PHOSPHATE 4 MG/ML
10 INJECTION, SOLUTION INTRA-ARTICULAR; INTRALESIONAL; INTRAMUSCULAR; INTRAVENOUS; SOFT TISSUE
Status: COMPLETED | OUTPATIENT
Start: 2021-02-26 | End: 2021-02-27

## 2021-02-26 RX ADMIN — CEFAZOLIN SODIUM 2 G: 10 POWDER, FOR SOLUTION INTRAVENOUS at 05:09

## 2021-02-26 RX ADMIN — DIAZEPAM 10 MG: 5 TABLET ORAL at 05:14

## 2021-02-26 RX ADMIN — DEXAMETHASONE SODIUM PHOSPHATE 10 MG: 4 INJECTION, SOLUTION INTRA-ARTICULAR; INTRALESIONAL; INTRAMUSCULAR; INTRAVENOUS; SOFT TISSUE at 21:27

## 2021-02-26 RX ADMIN — HYDROMORPHONE HYDROCHLORIDE 4 MG: 2 TABLET ORAL at 21:33

## 2021-02-26 RX ADMIN — PANTOPRAZOLE SODIUM 20 MG: 20 TABLET, DELAYED RELEASE ORAL at 08:58

## 2021-02-26 RX ADMIN — ACETAMINOPHEN 650 MG: 325 TABLET, FILM COATED ORAL at 05:09

## 2021-02-26 RX ADMIN — CEFAZOLIN SODIUM 2 G: 10 POWDER, FOR SOLUTION INTRAVENOUS at 12:34

## 2021-02-26 RX ADMIN — DIAZEPAM 10 MG: 5 TABLET ORAL at 14:48

## 2021-02-26 RX ADMIN — OXYCODONE HYDROCHLORIDE 10 MG: 5 TABLET ORAL at 06:28

## 2021-02-26 RX ADMIN — DEXAMETHASONE SODIUM PHOSPHATE 10 MG: 4 INJECTION, SOLUTION INTRA-ARTICULAR; INTRALESIONAL; INTRAMUSCULAR; INTRAVENOUS; SOFT TISSUE at 12:33

## 2021-02-26 RX ADMIN — HYDROMORPHONE HYDROCHLORIDE 2 MG: 2 TABLET ORAL at 17:30

## 2021-02-26 RX ADMIN — ACETAMINOPHEN 650 MG: 325 TABLET, FILM COATED ORAL at 12:08

## 2021-02-26 RX ADMIN — DOCUSATE SODIUM AND SENNOSIDES 1 TABLET: 8.6; 5 TABLET, FILM COATED ORAL at 08:58

## 2021-02-26 RX ADMIN — POLYETHYLENE GLYCOL 3350 17 G: 17 POWDER, FOR SOLUTION ORAL at 08:58

## 2021-02-26 RX ADMIN — HYDROMORPHONE HYDROCHLORIDE 4 MG: 2 TABLET ORAL at 10:27

## 2021-02-26 RX ADMIN — ACETAMINOPHEN 650 MG: 325 TABLET, FILM COATED ORAL at 17:26

## 2021-02-26 RX ADMIN — DOCUSATE SODIUM AND SENNOSIDES 1 TABLET: 8.6; 5 TABLET, FILM COATED ORAL at 21:36

## 2021-02-26 RX ADMIN — CEFAZOLIN SODIUM 2 G: 10 POWDER, FOR SOLUTION INTRAVENOUS at 21:26

## 2021-02-26 ASSESSMENT — COGNITIVE AND FUNCTIONAL STATUS - GENERAL
WALKING IN HOSPITAL ROOM: 3 - A LITTLE
STANDING UP FROM CHAIR USING ARMS: 2 - A LOT
AFFECT: WFL;ANXIOUS
MOVING TO AND FROM BED TO CHAIR: 3 - A LITTLE
CLIMB 3 TO 5 STEPS WITH RAILING: 2 - A LOT

## 2021-02-26 NOTE — PLAN OF CARE
Problem: Adult Inpatient Plan of Care  Goal: Plan of Care Review  Outcome: Progressing  Flowsheets (Taken 2/26/2021 1324)  Progress: improving  Plan of Care Reviewed With: patient  Outcome Summary: PT session completed.  [facility] mob 16.  Min A x 1 mob, gait, RW sppt.  Incr'd pain, and postural guarding.  Decr'd activity noe but progressing.  Needs to perform car trxfs and stairs for anticipated d/c home.  Maintain spinal prec

## 2021-02-26 NOTE — PROGRESS NOTES
Postop dressing removed  Sutures to midline lower back incision CDI  JPx1  Mepilex/Gauze with paper tape dressing applied  Pt tolerated well

## 2021-02-26 NOTE — PROGRESS NOTES
Patient: Zaria Pekc  Location: [address] [address]  MRN: [id number]  Today's date: 2/26/2021    Attempted to see patient for therapy. Unable due to medical hold(finishing b'fast; [facility] meds).

## 2021-02-26 NOTE — ASSESSMENT & PLAN NOTE
Still with pain today, drain in place draining bloody discharge  Cont with PT/OT  Ortho f/u  DVT proph  IS usage  Pain control.

## 2021-02-26 NOTE — PLAN OF CARE
Problem: Adult Inpatient Plan of Care  Goal: Plan of Care Review  Outcome: Progressing  Flowsheets (Taken 2/26/2021 1821)  Progress: improving  Plan of Care Reviewed With: patient  Outcome Summary: patient feels martha today, pain in control, up in bedside chair most of the day, ambulated in hallway for short distance with assist of walker in supervision. PHUC drain site dressing reinforced, denies any numbness or tingling.  Goal: Patient-Specific Goal (Individualization)  Outcome: Progressing  Goal: Absence of Hospital-Acquired Illness or Injury  Outcome: Progressing  Goal: Optimal Comfort and Wellbeing  Outcome: Progressing  Goal: Readiness for Transition of Care  Outcome: Progressing  Goal: Rounds/Family Conference  Outcome: Progressing     Problem: Adult Inpatient Plan of Care  Goal: Plan of Care Review  Outcome: Progressing  Flowsheets (Taken 2/26/2021 1821)  Progress: improving  Plan of Care Reviewed With: patient  Outcome Summary: patient feels martha today, pain in control, up in bedside chair most of the day, ambulated in hallway for short distance with assist of walker in supervision. PHUC drain site dressing reinforced, denies any numbness or tingling.  Goal: Patient-Specific Goal (Individualization)  Outcome: Progressing  Goal: Absence of Hospital-Acquired Illness or Injury  Outcome: Progressing  Goal: Optimal Comfort and Wellbeing  Outcome: Progressing  Goal: Readiness for Transition of Care  Outcome: Progressing  Goal: Rounds/Family Conference  Outcome: Progressing   Plan of Care Review  Plan of Care Reviewed With: patient  Progress: improving  Outcome Summary: patient feels martha today, pain in control, up in bedside chair most of the day, ambulated in hallway for short distance with assist of walker in supervision. PHUC drain site dressing reinforced, denies any numbness or tingling.

## 2021-02-26 NOTE — DISCHARGE INSTRUCTIONS
[facility] will come out to see you on Tuesday. They can be reached at [phone].      DISCHARGE INSTRUCTIONS:  LUMBAR FUSION  INCISON  Please make sure your incisions are checked at least twice daily for signs and symptoms of infection. If any of the below should occur, please call the office.  ? Draining of incisional site  ? Opening of incision  ? Fevers greater than 101.5 (low grade fevers post op are normal)  ? Flu-like symptoms  ? Increased redness and/or tenderness around the incision  If you have staples or sutures (not tape) in your incision they may be removed 2 weeks following your surgery. This may be done by a visiting nurse, family physician or by making an appointment to come into the office.  SHOWERING and the GAUZE DRESSING  ? You should change the gauze dressing over your incision every day and keep the incision  covered with dry sterile gauze and tape.  ? You may stop covering the incision with gauze five days after your surgery, as long as  the incision site is not leaking or draining fluid,  ? You may shower starting five days after your surgery (if incision is dry and intact). After  showering, gently dry the incision site.  ? Hair washing is permissible while in the shower. No tub baths, hot tubs or whirlpools  until seen in the office. Do not rub cream or lotion on the incision until seen in the  office.  EXERCISE  ? Only lift objects weighing less than 10-15 lbs  ? Do not bend or twist at the waist. Always bend your knees!!  ? Limit your sitting to 20-30 minute intervals. You should lie down or walk in between  sitting periods. There are no limitations for sitting in a recliner chair.  ? Walk as much as possible-let discomfort be your guide.  You may also go up and down  stairs as much as you can tolerate.  ? Walking outside (as long as it is nice weather) or walking on a treadmill is permitted (no  incline).  PAIN  ? Pain is expected after surgery. You should have a pain  medication and muscle relaxer when you leave the hospital. Take the pain medication as needed. Start with 1 and supplement with extra strength Tylenol before using another. Take 1 muscle relaxer for muscle spasms in the back, you can take it 3 times a day if needed.  It is usually most helpful at night and in the morning.  ? Take your pain medication as needed. As your pain level decreases, you may begin to take over-the­ counter Extra Strength Tylenol (3000mg/day maximum).  ? DO NOT take any anti-inflammatories (like Motrin, Ibuprofen, Advil, Aleve, Celebrex, etc) for 12 weeks after surgery. Taking anti-inflammatories can interfere with fusion healing.  ? Do not resume taking Fosamax or Actonel for 12 weeks after your fusion surgery.  ? You must avoid nicotine exposure for at least 12 weeks after surgery including smoking, the patch, nicotine gum and second hand smoke.  BLOODCLOTS  ? Some swelling is normal post operatively. If you notice swelling in one or both of your legs that is painful and/or hot to the touch and/or you have shortness of breath - please give us a call.  CONSTIPATION  ? It is normal to be constipated after surgery due to anesthesia and narcotics. Make sure to stay hydrated, eat fiber, get up and walk and use the Colace that is prescribed. You can also use warm prune juice and get over the counter Miralax, suppositories, enemas and Magnesium Citrate if constipation persists.  DRIVING  ? You may not drive a car until told otherwise by your physician. You may be a passenger  for short distances (20-30 minutes).  ? If you must take a longer trip make sure to make several stops so that you can walk  around and stretch your legs. Reclining the passenger seat seems to be the most  comfortable position for most patients.        FOLLOW-UP APPOINTMENTS  ? Your first post-op visit will be with Salome Muller, VALERIANO, ANP-C, ONP-C. This appointment was made for you when you signed up for surgery. If you do  not have a post op appointment, please call to make one.  ? If you have any additional questions/concerns please contact Salome Muller DNP, ANP-C, ONP-C., or MAYA Paige at [phone].

## 2021-02-26 NOTE — PROGRESS NOTES
Patient: Zaria Peck  Location: [facility] 4B 4219  MRN: [id number]  Today's date: 2/26/2021     Pt in bathroom and direct hand off to PCT for pt care needs    Zaria is a 71 y.o. female admitted on 2/24/2021 with Status post lumbar spine surgery for decompression of spinal cord. Principal problem is Status post lumbar spine surgery for decompression of spinal cord.    Past Medical History  Zaria has a past medical history of Anxiety, Left bundle branch block (LBBB), Lumbar radiculopathy, Lumbar stenosis, Osteoporosis, and Use of cane as ambulatory aid.    History of Present Illness   s/p L2-5 Posterior Lumbar Decompression, Posterior Lumbar Interbody Fusion, Instrumentation, Cage, Allograft, Autograft (N/A Spine Lumbar)  Dr. Singh 2/24/21    PT Pain    Date/Time Pain Type Pref Pain Scale Orientation Location Rating: Rest Rating: Activity Interventions [facility]   02/26/21 1204 Pain Assessment;Pain Reassessment;Post Activity number (Numeric Rating Pain Scale) incisional back 8 9 position adjusted;relaxation techniques promoted;other (see comments) breathing, postural relaxation, appropriate body [facility]   02/26/21 1208 Pain Assessment number (Numeric Rating Pain Scale) -- back 9 -- -- KEP          Prior Living Environment      Most Recent Value   Living Arrangements  house   Living Environment Comment  pt lives in bilevel home with 6 steps to main level with B HRs, 6 steps downstairs to laundry with no HR, tub shower, 5+5 KIMBERLY through garage to main level. pt reports son and grandkids are able to assist at discharge.          Prior Level of Function      Most Recent Value   Ambulation  assistive equipment   Transferring  assistive equipment   Toileting  independent   Bathing  independent   Dressing  independent   Eating  independent   Prior Level of Function Comment  pt reports occasionally using cane for ambulation, works full time in accounting   Assistive Device/Animal Currently Used at Home  cane,  shelby          PT Evaluation and Treatment - 02/26/21 1204        Time Calculation    Start Time  1204     Stop Time  1228     Time Calculation (min)  24 min        Session Details    Document Type  daily treatment/progress note     Mode of Treatment  physical therapy        General Information    Patient Profile Reviewed?  yes     Referring Physician  Francisco     General Observations of Patient  RN cleared.  Pt rec'd OOB in chair     Existing Precautions/Restrictions  fall;spinal;head of bed elevated 30 degrees        Cognition/Psychosocial    Affect/Mental Status (Cognitive)  WFL;anxious     Orientation Status (Cognition)  oriented x 4     Follows Commands (Cognition)  follows multi-step commands;verbal cues/prompting required;repetition of directions required     Comment, Cognition  receptive; distracted by pain        Bed Mobility    Comment (Bed Mobility)  OOB at time of visit        Transfers    Transfers  stand pivot transfer;toilet transfer        Sit to Stand Transfer    [place], Sit to Stand Transfer  minimum assist (75% or more patient effort);verbal cues     Assistive Device  walker, front-wheeled     Comment  from chair, arm rests        Stand to Sit Transfer    Comment  see toilet trxf        Stand Pivot Transfer    [place], Stand Pivot/Stand Step Transfer  minimum assist (75% or more patient effort);verbal cues     Verbal Cues  maintaining center of gravity over base of support;proper use of assistive device     Assistive Device  walker, front-wheeled     Comment  all dir; asst w/ mgmt, cue for posture        Toilet Transfer    Transfer Technique  stand-sit;stand pivot     [place], Toilet Transfer  moderate assist (50-74% patient effort);verbal cues     Verbal Cues  preparatory posture;hand placement;safety     Assistive Device  grab bars/safety frame;walker, front-wheeled        Gait Training    [place], Gait  minimum assist (75% or more patient effort);verbal cues    posture,  walker mgmt, sequencing    Assistive Device  walker, front-wheeled     Distance in Feet  --    (1.) 25ft Min A; (2.) 35ft CS    Gait Pattern Utilized  step-to;step-through;other (see comments)     Deviations/Abnormal Patterns (Gait)  step length decreased;stride length decreased;rut decreased;gait speed decreased;other (see comments)    c/o back pain; postural bracing    Comment  walk one: swithching b/w step to and step through; second walk: step through and grtr attn to postural cueing        Stairs Training    [place], Stairs  unable to assess;other (see comments)     Comment  incr'd pain, need to void bladder; pls assess for home        Safety Issues, Functional Mobility    Impairments Affecting Function (Mobility)  pain;strength;endurance/activity tolerance     Comment, Safety Issues/Impairments (Mobility)  spinal prec/safety; incr'd pain        Balance    Static Sitting Balance  WFL;unsupported;sitting in chair     Dynamic Sitting Balance  WFL;supported;sitting in chair     Sit to Stand Dynamic Balance  mild impairment;supported     Static Standing Balance  mild impairment;supported    RW    Dynamic Standing Balance  mild impairment;supported    RW, w/i NELIA    Comment, Balance  balance assessed during performance of trxfs and amb; stairs TBA        AM-PAC (TM) - Mobility (Current Function)    Turning from your back to your side while in a flat bed without using bedrails?  3 - A Little     Moving from lying on your back to sitting on the side of a flat bed without using bedrails?  3 - A Little     Moving to and from a bed to a chair?  3 - A Little     Standing up from a chair using your arms?  2 - A Lot     To walk in a hospital room?  3 - A Little     Climbing 3-5 steps with a railing?  2 - A Lot     AM-PAC (TM) Mobility Score  16        Therapy Assessment/Plan (PT)    Rehab Potential (PT)  good, to achieve stated therapy goals     Therapy Frequency (PT)  5 times/wk     Problem List   pain;strength;postural control;other (see comments)    endurance       Progress Summary (PT)    Daily Outcome Statement (PT)  PT session completed.  [facility] mob 16.  Min A x 1 mob, gait, RW sppt.  Incr'd pain, and postural guarding.  Decr'd activity noe but progressing.  Needs to perform car trxfs and stairs for anticipated d/c home.  Maintain spinal prec.     Symptoms Noted During/After Treatment  increased pain        Therapy Plan Review/Discharge Plan (PT)    PT Recommended Discharge Disposition  home with home health;home with assist;skilled nursing facility    24hr asst vs SNF per PT eval    Anticipated Equipment Needs at Discharge (PT Eval)  none        Plan of Care Review    Plan of Care Reviewed With  patient                       Education provided this session. See the Patient Education summary report for full details.    PT Goals      Most Recent Value   Bed Mobility Goal 1   Activity/Assistive Device  rolling to left, rolling to right, sit to supine/supine to sit at 02/25/2021 0918   [place]  independent at 02/25/2021 0918   Time Frame  by discharge at 02/25/2021 0918   Progress/Outcome  goal ongoing at 02/25/2021 0918   Transfer Goal 1   Activity/Assistive Device  sit-to-stand/stand-to-sit, bed-to-chair/chair-to-bed, walker, front-wheeled [LRAD] at 02/25/2021 0918   [place]  modified independence at 02/25/2021 0918   Time Frame  by discharge at 02/25/2021 0918   Progress/Outcome  goal ongoing at 02/25/2021 0918   Gait Training Goal 1   Activity/Assistive Device  gait (walking locomotion), walker, front-wheeled [LRAD] at 02/25/2021 0918   [place]  modified independence at 02/25/2021 0918   Distance  100 at 02/25/2021 0918   Time Frame  by discharge at 02/25/2021 0918   Progress/Outcome  goal ongoing at 02/25/2021 0918   Stairs Goal 1   Activity/Assistive Device  ascending stairs, descending stairs at 02/25/2021 0918   [place]  modified independence at 02/25/2021 0918   Number of Stairs   6 at 02/25/2021 0918   Time Frame  by discharge at 02/25/2021 0918   Progress/Outcome  goal ongoing at 02/25/2021 0918

## 2021-02-26 NOTE — PLAN OF CARE
Problem: Adult Inpatient Plan of Care  Goal: Plan of Care Review  Flowsheets  Taken 2/26/2021 1617 by Kate Boykin MSW  Outcome Summary: MSW CC hasnt heard from [facility] yet. MSW CC reached back out to them at this time and is now speaking with them.they were reached at [phone]. they said they can do a tuesday start date for her.  Taken 2/26/2021 1324 by Dustin Mcconnell PTA  Plan of Care Reviewed With: patient     PLAN: dc home with [facility]. [phone]. They have a start date of Tuesday for the pt.

## 2021-02-26 NOTE — PATIENT CARE CONFERENCE
Care Progression Rounds Note  Date: 2/26/2021  Time: 10:27 AM     Patient Name: Zaria Peck     Medical Record Number: [id number]   YOB: 1950  Sex: Female      Room/Bed: 4219    Admitting Diagnosis: Lumbar radiculitis [M54.16]  Spinal stenosis of lumbar region with neurogenic claudication [M48.062]  Status post lumbar spine surgery for decompression of spinal cord [Z98.890]   Admit Date/Time: 2/24/2021  8:58 AM    Primary Diagnosis: Status post lumbar spine surgery for decompression of spinal cord  Principal Problem: Status post lumbar spine surgery for decompression of spinal cord    GMLOS: 2.7  Anticipated Discharge Date: 2/27/2021    AM-PAC  Mobility Score: 16    Discharge Planning:  Living Arrangements: house    Barriers to Discharge:  Barriers to Discharge: Medical issues not resolved  Comment: lumbar decompression    Participants:  social work/services, nursing

## 2021-02-26 NOTE — PROGRESS NOTES
Orthopedic Progress Note    Subjective     Pt seen and examined at bedside, resting comfortably. No acute events overnight. Back feeling sore this morning but pain in legs remains improved. Not passing gas yet. Urinating without issue.     Objective   Temp:  [36.5 °C (97.7 °F)-37 °C (98.6 °F)] 36.9 °C (98.4 °F)  Heart Rate:  [70-79] 77  Resp:  [17-18] 18  BP: (121-136)/(57-70) 127/69  SpO2:  [96 %-99 %] 99 %    General: AVSS, NAD    Spine:   Dressing C/D/I  Appropriately TTP    RLE  Skin intact  No pain with ROM of ankle/toes  SILT in sural/saphenous/DP/SP/tibial distributions  Motor intact to quad/hamstrings/EHL/FHL/TA/peroneals/GSC  Pulses 2+    LLE  Skin intact  No pain with ROM of ankle/toes  SILT in sural/saphenous/DP/SP/tibial distributions  Motor intact to quad/hamstrings/EHL/FHL/TA/peroneals/GSC  Pulses 2+      Assessment   71F S/P L2-5 PLIF on 2/24 by Dr. Singh    Plan     Pain control  Ancef q8 while drain in  WBAT all extremities  DVT PPX: SCDs  PT/OT  AM LABS  Dispo planning: home when stable    Alexandro Snell DO, PGY-2  Orthopedic Surgery  Pager: [phone]

## 2021-02-27 LAB
ANION GAP SERPL CALC-SCNC: 9 MEQ/L (ref 3–15)
BUN SERPL-MCNC: 21 MG/DL (ref 8–20)
CALCIUM SERPL-MCNC: 8.8 MG/DL (ref 8.9–10.3)
CHLORIDE SERPL-SCNC: 103 MEQ/L (ref 98–109)
CO2 SERPL-SCNC: 26 MEQ/L (ref 22–32)
CREAT SERPL-MCNC: 0.7 MG/DL (ref 0.6–1.1)
ERYTHROCYTE [DISTWIDTH] IN BLOOD BY AUTOMATED COUNT: 11.9 % (ref 11.7–14.4)
GFR SERPL CREATININE-BSD FRML MDRD: >60 ML/MIN/1.73M*2
GLUCOSE SERPL-MCNC: 156 MG/DL (ref 70–99)
HCT VFR BLDCO AUTO: 38.6 % (ref 35–45)
HGB BLD-MCNC: 12.5 G/DL (ref 11.8–15.7)
MCH RBC QN AUTO: 30 PG (ref 28–33.2)
MCHC RBC AUTO-ENTMCNC: 32.4 G/DL (ref 32.2–35.5)
MCV RBC AUTO: 92.6 FL (ref 83–98)
PDW BLD AUTO: 12.3 FL (ref 9.4–12.3)
PLATELET # BLD AUTO: 136 K/UL (ref 150–369)
POTASSIUM SERPL-SCNC: 4.3 MEQ/L (ref 3.6–5.1)
RBC # BLD AUTO: 4.17 M/UL (ref 3.93–5.22)
SODIUM SERPL-SCNC: 138 MEQ/L (ref 136–144)
WBC # BLD AUTO: 9.97 K/UL (ref 3.8–10.5)

## 2021-02-27 PROCEDURE — 85027 COMPLETE CBC AUTOMATED: CPT | Performed by: STUDENT IN AN ORGANIZED HEALTH CARE EDUCATION/TRAINING PROGRAM

## 2021-02-27 PROCEDURE — 63600000 HC DRUGS/DETAIL CODE: Performed by: STUDENT IN AN ORGANIZED HEALTH CARE EDUCATION/TRAINING PROGRAM

## 2021-02-27 PROCEDURE — 97535 SELF CARE MNGMENT TRAINING: CPT | Mod: GO

## 2021-02-27 PROCEDURE — 99231 SBSQ HOSP IP/OBS SF/LOW 25: CPT | Performed by: HOSPITALIST

## 2021-02-27 PROCEDURE — 97530 THERAPEUTIC ACTIVITIES: CPT | Mod: GO

## 2021-02-27 PROCEDURE — 97530 THERAPEUTIC ACTIVITIES: CPT | Mod: GP

## 2021-02-27 PROCEDURE — 63600000 HC DRUGS/DETAIL CODE: Performed by: NURSE PRACTITIONER

## 2021-02-27 PROCEDURE — 63700000 HC SELF-ADMINISTRABLE DRUG: Performed by: NURSE PRACTITIONER

## 2021-02-27 PROCEDURE — 25000000 HC PHARMACY GENERAL: Performed by: STUDENT IN AN ORGANIZED HEALTH CARE EDUCATION/TRAINING PROGRAM

## 2021-02-27 PROCEDURE — 25800000 HC PHARMACY IV SOLUTIONS: Performed by: STUDENT IN AN ORGANIZED HEALTH CARE EDUCATION/TRAINING PROGRAM

## 2021-02-27 PROCEDURE — 63700000 HC SELF-ADMINISTRABLE DRUG: Performed by: STUDENT IN AN ORGANIZED HEALTH CARE EDUCATION/TRAINING PROGRAM

## 2021-02-27 PROCEDURE — 80048 BASIC METABOLIC PNL TOTAL CA: CPT | Performed by: STUDENT IN AN ORGANIZED HEALTH CARE EDUCATION/TRAINING PROGRAM

## 2021-02-27 PROCEDURE — 12000000 HC ROOM AND CARE MED/SURG

## 2021-02-27 RX ADMIN — CEFAZOLIN SODIUM 2 G: 10 POWDER, FOR SOLUTION INTRAVENOUS at 21:14

## 2021-02-27 RX ADMIN — HYDROMORPHONE HYDROCHLORIDE 2 MG: 2 TABLET ORAL at 20:26

## 2021-02-27 RX ADMIN — POLYETHYLENE GLYCOL 3350 17 G: 17 POWDER, FOR SOLUTION ORAL at 08:50

## 2021-02-27 RX ADMIN — DEXAMETHASONE SODIUM PHOSPHATE 10 MG: 4 INJECTION, SOLUTION INTRA-ARTICULAR; INTRALESIONAL; INTRAMUSCULAR; INTRAVENOUS; SOFT TISSUE at 04:55

## 2021-02-27 RX ADMIN — DOCUSATE SODIUM AND SENNOSIDES 1 TABLET: 8.6; 5 TABLET, FILM COATED ORAL at 08:50

## 2021-02-27 RX ADMIN — HYDROMORPHONE HYDROCHLORIDE 4 MG: 2 TABLET ORAL at 10:50

## 2021-02-27 RX ADMIN — CEFAZOLIN SODIUM 2 G: 10 POWDER, FOR SOLUTION INTRAVENOUS at 13:36

## 2021-02-27 RX ADMIN — CEFAZOLIN SODIUM 2 G: 10 POWDER, FOR SOLUTION INTRAVENOUS at 04:54

## 2021-02-27 RX ADMIN — DOCUSATE SODIUM AND SENNOSIDES 1 TABLET: 8.6; 5 TABLET, FILM COATED ORAL at 20:07

## 2021-02-27 RX ADMIN — PANTOPRAZOLE SODIUM 20 MG: 20 TABLET, DELAYED RELEASE ORAL at 08:50

## 2021-02-27 ASSESSMENT — COGNITIVE AND FUNCTIONAL STATUS - GENERAL
AFFECT: WFL
TOILETING: 3 - A LITTLE
DRESSING REGULAR UPPER BODY CLOTHING: 4 - NONE
WALKING IN HOSPITAL ROOM: 3 - A LITTLE
HELP NEEDED FOR PERSONAL GROOMING: 4 - NONE
HELP NEEDED FOR BATHING: 3 - A LITTLE
CLIMB 3 TO 5 STEPS WITH RAILING: 3 - A LITTLE
STANDING UP FROM CHAIR USING ARMS: 3 - A LITTLE
DRESSING REGULAR LOWER BODY CLOTHING: 3 - A LITTLE
MOVING TO AND FROM BED TO CHAIR: 3 - A LITTLE
EATING MEALS: 4 - NONE

## 2021-02-27 NOTE — PROGRESS NOTES
Patient: Zaria Peck  Location: [facility] 4B 4219  MRN: [id number]  Today's date: 2/27/2021  Patient left in recliner with OT in gym for OT session.  Zaria is a 71 y.o. female admitted on 2/24/2021 with Status post lumbar spine surgery for decompression of spinal cord. Principal problem is Status post lumbar spine surgery for decompression of spinal cord.    Past Medical History  Zaria has a past medical history of Anxiety, Left bundle branch block (LBBB), Lumbar radiculopathy, Lumbar stenosis, Osteoporosis, and Use of cane as ambulatory aid.    History of Present Illness   s/p L2-5 Posterior Lumbar Decompression, Posterior Lumbar Interbody Fusion, Instrumentation, Cage, Allograft, Autograft (N/A Spine Lumbar)  Dr. Singh 2/24/21    PT Vitals    Date/Time Pulse SpO2 Pt Activity O2 Therapy BP BP Location BP Method Pt Position [facility]   02/27/21 1151 87 98 % At rest None (Room air) 140/79 Left upper arm Automatic Sitting MK      PT Pain    Date/Time Pain Type Pref Pain Scale Orientation Location Rating: Rest Rating: Activity Interventions [facility]   02/27/21 1131 Pain Assessment number (Numeric Rating Pain Scale) incisional back 7 7 position adjusted MK   02/27/21 1151 Pain Reassessment number (Numeric Rating Pain Scale) incisional back 7 7 position adjusted MK          Prior Living Environment      Most Recent Value   Living Arrangements  house   Living Environment Comment  pt lives in bilevel home with 6 steps to main level with B HRs, 6 steps downstairs to laundry with no HR, tub shower, 5+5 KIMBERLY through garage to main level. pt reports son and grandkids are able to assist at discharge.          Prior Level of Function      Most Recent Value   Ambulation  assistive equipment   Transferring  assistive equipment   Toileting  independent   Bathing  independent   Dressing  independent   Eating  independent   Prior Level of Function Comment  pt reports occasionally using cane for ambulation, works full time in  accounting   Assistive Device/Animal Currently Used at Home  cane, straight          PT Evaluation and Treatment - 02/27/21 1131        Time Calculation    Start Time  1131     Stop Time  1151     Time Calculation (min)  20 min        Session Details    Document Type  daily treatment/progress note     Mode of Treatment  physical therapy        General Information    Patient Profile Reviewed?  yes     Referring Physician  Francisco     Patient/Family/Caregiver Comments/Observations  RN check prior to session     General Observations of Patient  Patient received in recliner chair at start of visit.     Existing Precautions/Restrictions  fall;spinal;head of bed elevated 30 degrees        Cognition/Psychosocial    Comment, Cognition  AO x 3        Bed Mobility    Comment (Bed Mobility)  received OOB        Transfers    Transfers  car transfer        Sit to Stand Transfer    [place], Sit to Stand Transfer  supervision     Verbal Cues  hand placement     Assistive Device  walker, front-wheeled     Comment  from chair        Stand to Sit Transfer    [place], Stand to Sit Transfer  supervision     Verbal Cues  hand placement     Assistive Device  walker, front-wheeled     Comment  to chair        Car Transfer    Transfer Technique  stand-sit;sit-stand     [place], Car Transfer  close supervision;verbal cues     Verbal Cues  technique;hand placement     Assistive Device  walker, front-wheeled        Gait Training    [place], Gait  supervision     Assistive Device  walker, front-wheeled     Distance in Feet  70 feet     Gait Pattern Utilized  step-through     Deviations/Abnormal Patterns (Gait)  stride length decreased;step length decreased;gait speed decreased        Stairs Training    [place], Stairs  close supervision     Assistive Device  railing     Handrail Location  both sides     Number of Stairs  2     Stair Height  6 inches     Ascending Stairs Technique  step-to-step     Descending Stairs  Technique  step-to-step        Balance    Static Sitting Balance  WFL     Dynamic Sitting Balance  WFL     Sit to Stand Dynamic Balance  mild impairment     Static Standing Balance  mild impairment     Dynamic Standing Balance  mild impairment        AM-PAC (TM) - Mobility (Current Function)    Turning from your back to your side while in a flat bed without using bedrails?  3 - A Little     Moving from lying on your back to sitting on the side of a flat bed without using bedrails?  3 - A Little     Moving to and from a bed to a chair?  3 - A Little     Standing up from a chair using your arms?  3 - A Little     To walk in a hospital room?  3 - A Little     Climbing 3-5 steps with a railing?  3 - A Little     AM-PAC (TM) Mobility Score  18        Therapy Assessment/Plan (PT)    Rehab Potential (PT)  good, to achieve stated therapy goals     Therapy Frequency (PT)  5 times/wk        Progress Summary (PT)    Daily Outcome Statement (PT)  Patient completed stairs, car transfer, and additional distance of ambulation with supervision and supports as noted. Anticipate D/C home when medically appropriate.        Therapy Plan Review/Discharge Plan (PT)    PT Recommended Discharge Disposition  home with home health;home with assist     Anticipated Equipment Needs at Discharge (PT Eval)  none        Plan of Care Review    Plan of Care Reviewed With  patient                       Education provided this session. See the Patient Education summary report for full details.    PT Goals      Most Recent Value   Bed Mobility Goal 1   Activity/Assistive Device  rolling to left, rolling to right, sit to supine/supine to sit at 02/25/2021 0918   [place]  independent at 02/25/2021 0918   Time Frame  by discharge at 02/25/2021 0918   Progress/Outcome  goal ongoing at 02/27/2021 1131   Transfer Goal 1   Activity/Assistive Device  sit-to-stand/stand-to-sit, bed-to-chair/chair-to-bed, walker, front-wheeled [LRAD] at 02/25/2021 0918    [place]  modified independence at 02/25/2021 0918   Time Frame  by discharge at 02/25/2021 0918   Progress/Outcome  goal ongoing at 02/27/2021 1131   Gait Training Goal 1   Activity/Assistive Device  gait (walking locomotion), walker, front-wheeled [LRAD] at 02/25/2021 0918   [place]  modified independence at 02/25/2021 0918   Distance  100 at 02/25/2021 0918   Time Frame  by discharge at 02/25/2021 0918   Progress/Outcome  goal ongoing at 02/27/2021 1131   Stairs Goal 1   Activity/Assistive Device  ascending stairs, descending stairs at 02/25/2021 0918   [place]  modified independence at 02/25/2021 0918   Number of Stairs  6 at 02/25/2021 0918   Time Frame  by discharge at 02/25/2021 0918   Progress/Outcome  goal ongoing at 02/27/2021 1131

## 2021-02-27 NOTE — PROGRESS NOTES
Patient: Zaria Peck  Location: [facility] 4B 4219  MRN: [id number]  Today's date: 2/27/2021     Session ended with pt seated in recliner, alarmed with all immediate needs in reach. VSS/NAD. RN notified.       Zaria is a 71 y.o. female admitted on 2/24/2021 with Status post lumbar spine surgery for decompression of spinal cord. Principal problem is Status post lumbar spine surgery for decompression of spinal cord.    Past Medical History  Zaria has a past medical history of Anxiety, Left bundle branch block (LBBB), Lumbar radiculopathy, Lumbar stenosis, Osteoporosis, and Use of cane as ambulatory aid.    History of Present Illness   s/p L2-5 Posterior Lumbar Decompression, Posterior Lumbar Interbody Fusion, Instrumentation, Cage, Allograft, Autograft (N/A Spine Lumbar)  Dr. Singh 2/24/21    OT Vitals    Date/Time Pulse HR Source SpO2 Pt Activity O2 Therapy BP BP Location BP Method Pt Position [facility]   02/27/21 1148 82 Monitor 94 % At rest None (Room air) 146/78 Left upper arm Automatic Sitting [facility]   02/27/21 1151 87 -- 98 % At rest None (Room air) 140/79 Left upper arm Automatic Sitting [facility]      OT Pain    Date/Time Pain Type Pref Pain Scale Orientation Location Rating: Rest Rating: Activity Interventions [facility]   02/27/21 1148 Pain Assessment number (Numeric Rating Pain Scale) incisional back 6 7 position adjusted [facility]   02/27/21 1151 Pain Reassessment number (Numeric Rating Pain Scale) incisional back 7 7 position adjusted [facility]          Prior Living Environment      Most Recent Value   Living Arrangements  house   Living Environment Comment  pt lives in bilevel home with 6 steps to main level with B HRs, 6 steps downstairs to laundry with no HR, tub shower, 5+5 KIMBERLY through garage to main level. pt reports son and grandkids are able to assist at discharge.          Prior Level of Function      Most Recent Value   Ambulation  assistive equipment   Transferring  assistive equipment   Toileting  independent    Bathing  independent   Dressing  independent   Eating  independent   Prior Level of Function Comment  pt reports occasionally using cane for ambulation, works full time in accounting   Assistive Device/Animal Currently Used at Home  cane, straight          Occupational Profile      Most Recent Value   Reason for Services/Referral  ADL dysfunction   Successful Occupations  IND w/ ADL/IADL PTA   Occupational History/Life Experiences  works FT as an    Performance Patterns  Mother,  Grandmother   Environmental Supports and Barriers  supportive son and grandchilder to assist at d/c   Patient Goals  To decrease pain          OT Evaluation and Treatment - 02/27/21 1148        Time Calculation    Start Time  1148     Stop Time  1216     Time Calculation (min)  28 min        Session Details    Document Type  daily treatment/progress note     Mode of Treatment  occupational therapy        General Information    Patient Profile Reviewed?  yes     Onset of Illness/Injury or Date of Surgery  02/24/21     Referring Physician  Francisco     General Observations of Patient  Pt rec'd in therapy gym working w/ PT     Existing Precautions/Restrictions  fall;spinal;head of bed elevated 30 degrees        Cognition/Psychosocial    Affect/Mental Status (Cognitive)  WFL     Orientation Status (Cognition)  oriented x 4     Follows Commands (Cognition)  WFL     Cognitive Function (Cognitive)  WFL        Bed Mobility    Comment (Bed Mobility)  NT, pt rec'd in therapy gym working w/ PT        Transfers    Transfers  toilet transfer;tub transfer     Comment  Pt is supervision for functional tx and short dist ambulation w/ RW; ClS for tub tx on tub bench        Sit to Stand Transfer    [place], Sit to Stand Transfer  supervision;verbal cues     Verbal Cues  hand placement;proper use of assistive device     Assistive Device  walker, front-wheeled     Comment  From recliner        Stand to Sit Transfer    [place], Stand to  Sit Transfer  supervision;verbal cues     Verbal Cues  hand placement     Assistive Device  walker, front-wheeled     Comment  to recliner        Toilet Transfer    Transfer Technique  sit-stand;stand-sit     [place], Toilet Transfer  supervision;verbal cues     Verbal Cues  hand placement;proper use of assistive device     Assistive Device  grab bars/safety frame;walker, front-wheeled     Comment  Ambulate in room to toilet w/ RW        Tub Transfer    Transfer Technique  sit and swing     [place], Tub Transfer  close supervision;verbal cues     Verbal Cues  hand placement;safety;technique;proper use of assistive device     Assistive Device  tub bench;walker, front-wheeled        Safety Issues, Functional Mobility    Impairments Affecting Function (Mobility)  pain;strength;balance        Balance    Static Sitting Balance  WFL;sitting in chair     Dynamic Sitting Balance  WFL;sitting in chair     Sit to Stand Dynamic Balance  mild impairment;supported;standing     Static Standing Balance  mild impairment;supported;standing     Dynamic Standing Balance  mild impairment;supported;standing        Lower Body Dressing    Self-Performance  dons/doffs left sock;dons/doffs right sock     [place]  supervision     Position  supported sitting     Adaptive Equipment  none     Comment  cross leg technique        Grooming    Self-Performance  washes, rinses and dries hands     [place]  supervision     Position  supported standing;sink side     Adaptive Equipment  none        Toileting    [place]  distant supervision;perform bladder hygiene;adjust/manage clothing     Position  supported sitting;supported standing     Adaptive Equipment  grab bar/safety frame     Comment  reccomended reacher in BR at home for clothing mgmt while maintaining spinal precautions        BADL Safety/Performance    Impairments, BADL Safety/Performance  balance;endurance/activity tolerance;pain     Skilled BADL  Treatment/Intervention  adaptive equipment training;BADL process/adaptation training;compensatory training;energy conservation;environmental modifications        Coping    Observed Emotional State  accepting     Verbalized Emotional State  acceptance        AM-PAC (TM) - ADL (Current Function)    Putting on and taking off regular lower body clothing?  3 - A Little     Bathing?  3 - A Little     Toileting?  3 - A Little     Putting on/taking off regular upper body clothing?  4 - None     How much help for taking care of personal grooming?  4 - None     Eating meals?  4 - None     AM-PAC (TM) ADL Score  21        Therapy Assessment/Plan (OT)    Rehab Potential (OT)  good, to achieve stated therapy goals     Therapy Frequency (OT)  3-5 times/wk        Progress Summary (OT)    Daily Outcome Statement (OT)  OT tx completed ([facility] 21). Pt limited by decreased balance and endurance, and pain. Currently ClS - Sup for functional tx and short distance ambulation w/ RW, ClS for LB dressing, and dist sup for toileting and grooming. Reccomend d/c home when medically clear w/ assist from son for ADL/IADLs as needed and home OT to continue progress and ensure safety     Symptoms Noted During/After Treatment  increased pain        Therapy Plan Review/Discharge Plan (OT)    OT Recommended Discharge Disposition  home with assist;home with home health     Anticipated Equipment Needs At Discharge (OT)  commode, 3-in-1;tub bench;walker, front-wheeled                   Education provided this session. See the Patient Education summary report for full details.         OT Goals      Most Recent Value   Bed Mobility Goal 1   Activity/Assistive Device  bed mobility activities, all at 02/25/2021 0917   [place]  modified independence at 02/25/2021 0917   Time Frame  by discharge at 02/25/2021 0917   Progress/Outcome  goal ongoing at 02/27/2021 1148   Transfer Goal 1   Activity/Assistive Device  all transfers at 02/25/2021 0917    [place]  modified independence at 02/25/2021 0917   Time Frame  by discharge at 02/25/2021 0917   Progress/Outcome  goal ongoing at 02/27/2021 1148   Bathing Goal 1   Activity/Assistive Device  bathing skills, all at 02/25/2021 0917   [place]  modified independence at 02/25/2021 0917   Time Frame  by discharge at 02/25/2021 0917   Progress/Outcome  goal ongoing at 02/27/2021 1148   Dressing Goal 1   Activity/Adaptive Equipment  dressing skills, all at 02/25/2021 0917   [place]  modified independence at 02/25/2021 0917   Time Frame  by discharge at 02/25/2021 0917   Progress/Outcome  goal ongoing at 02/27/2021 1148   Toileting Goal 1   Activity/Assistive Device  toileting skills, all at 02/25/2021 0917   [place]  modified independence at 02/25/2021 0917   Time Frame  by discharge at 02/25/2021 0917   Progress/Outcome  goal ongoing at 02/27/2021 1148

## 2021-02-27 NOTE — PROGRESS NOTES
Pt without new complaints, doing well  AFVSS PHUC in place  5/5 motor BLE all muscle groups  SILT BLE all sens dist  Dressing dry     A/p stable post op day 3  -pt, oob  -pain control  -abx/drain-dc prior to discharge to home  -scds  -med management  -bowel regimen  -dc planning for this afternoon if patient clears PT

## 2021-02-27 NOTE — PLAN OF CARE
Problem: Adult Inpatient Plan of Care  Goal: Plan of Care Review  Outcome: Progressing  Flowsheets (Taken 2/27/2021 1301)  Progress: improving  Plan of Care Reviewed With: patient  Outcome Summary: OT tx completed ([facility] 21). Pt limited by decreased balance and endurance, and pain. Currently ClS - Sup for functional tx and short distance ambulation w/ RW, ClS for LB dressing, and dist sup for toileting and grooming. Reccomend d/c home when medically clear w/ assist from son for ADL/IADLs as needed and home OT to continue progress and ensure safety

## 2021-02-27 NOTE — PLAN OF CARE
Problem: Adult Inpatient Plan of Care  Goal: Plan of Care Review  Flowsheets (Taken 2/27/2021 4746)  Progress: improving  Plan of Care Reviewed With: patient  Outcome Summary: Patient completed stairs, car transfer, and additional distance of ambulation with supervision and supports as noted. Anticipate D/C home when medically appropriate.

## 2021-02-27 NOTE — PROGRESS NOTES
Hospital Medicine Service -  Daily Progress Note       SUBJECTIVE   Interval History: No events overnight, still having some back pain. No CP, SOB, abd pain, N/V/D/C.      OBJECTIVE      Vital signs in last 24 hours:  Temp:  [36.1 °C (97 °F)-36.5 °C (97.7 °F)] 36.1 °C (97 °F)  Heart Rate:  [76-99] 87  Resp:  [16-18] 18  BP: (111-148)/(63-86) 140/79    Intake/Output Summary (Last 24 hours) at 2/27/2021 1315  Last data filed at 2/27/2021 0900  Gross per 24 hour   Intake 650 ml   Output 1685 ml   Net -1035 ml       PHYSICAL EXAMINATION      Physical Exam    Vitals signs and nursing note reviewed.   Constitutional:       General: She is not in acute distress.     Appearance: Normal appearance. She is not ill-appearing or toxic-appearing.   HENT:      Head: Normocephalic and atraumatic.      Right Ear: External ear normal.      Left Ear: External ear normal.      Nose: Nose normal.   Eyes:      General: No scleral icterus.        Right eye: No discharge.         Left eye: No discharge.      Pupils: Pupils are equal, round, and reactive to light.   Neck:      Musculoskeletal: Normal range of motion. No neck rigidity.   Cardiovascular:      Rate and Rhythm: Normal rate and regular rhythm.      Pulses: Normal pulses.      Heart sounds: Normal heart sounds. No murmur. No friction rub. No gallop.    Pulmonary:      Effort: Pulmonary effort is normal. No respiratory distress.      Breath sounds: Normal breath sounds. No stridor. No wheezing, rhonchi or rales.   Chest:      Chest wall: No tenderness.   Abdominal:      General: Abdomen is flat. Bowel sounds are normal. There is no distension.      Palpations: Abdomen is soft. There is no mass.      Tenderness: There is no abdominal tenderness. There is no guarding or rebound.      Hernia: No hernia is present.   Musculoskeletal:         General: Tenderness present. No deformity or signs of injury.      Right lower leg: No edema.      Comments: Limited ROM due to pain in L  spine region   Skin:     General: Skin is warm and dry.      Comments: Surgical site is C/D/I, drain in place.      Neurological:      General: No focal deficit present.      Mental Status: She is alert and oriented to person, place, and time. Mental status is at baseline.   Psychiatric:         Mood and Affect: Mood normal.         Behavior: Behavior normal.         Thought Content: Thought content normal.         Judgment: Judgment normal.    LINES, CATHETERS, DRAINS, AIRWAYS, AND WOUNDS   Lines, Drains, and Airways:  Wounds (agree with documentation and present on admission):  Peripheral IV 02/24/21 Left;Posterior Wrist (Active)   Number of days: 2       Drain 1 Right;Lower;Medial Back 10 Fr. (Active)   Number of days: 2       Surgical Incision Back (Active)   Number of days: 2         Comments:      LABS / IMAGING / TELE      Labs  CBC Results       02/27/21 02/26/21 02/25/21                    0500 0336 0437         WBC 9.97 8.77 8.54         RBC 4.17 4.34 4.44         HGB 12.5 13.0 13.1         HCT 38.6 39.8 41.2         MCV 92.6 91.7 92.8         MCH 30.0 30.0 29.5         MCHC 32.4 32.7 31.8          144 156                     CMP Results       02/27/21 02/26/21 02/25/21                    0500 0336 0437          139 140         K 4.3 4.1 4.9         Cl 103 106 107         CO2 26 24 24         Glucose 156 94 145         BUN 21 16 20         Creatinine 0.7 0.8 0.8         Calcium 8.8 8.7 8.1         Anion Gap 9 9 9         EGFR >60.0 >60.0 >60.0                         SARS CoV 2 RNA (no units)   Date/Time Value   02/19/2021 0852 NOT DETECTED     No new imaging or cardio studies.       ASSESSMENT AND PLAN      LBBB (left bundle branch block)  Assessment & Plan  Stable, no new changes on EkG.         Gastroesophageal reflux disease without esophagitis  Assessment & Plan  Stable, cont with po meds, diet as tolerated.         Essential hypertension  Assessment & Plan  Stable BP, cont to monitor,  low salt diet.         * Status post lumbar spine surgery for decompression of spinal cord  Assessment & Plan  POD #3   Still with pain today, drain in place draining bloody discharge  Cont with PT/OT  Ortho f/u  DVT proph  IS usage  Pain control.      VTE Assessment: [place]    VTE Prophylaxis:    Code Status: Full Code      Estimated Discharge Date: 2/27/2021   Disposition Planning: PT/OT evaluation ongoing, DC when cleared by Ortho, drain still in place.        Elizabeth Moran MD  2/27/2021

## 2021-02-28 VITALS
HEART RATE: 82 BPM | HEIGHT: 63 IN | RESPIRATION RATE: 13 BRPM | SYSTOLIC BLOOD PRESSURE: 129 MMHG | BODY MASS INDEX: 25.69 KG/M2 | TEMPERATURE: 98 F | DIASTOLIC BLOOD PRESSURE: 67 MMHG | OXYGEN SATURATION: 98 % | WEIGHT: 145 LBS

## 2021-02-28 PROCEDURE — 25800000 HC PHARMACY IV SOLUTIONS: Performed by: STUDENT IN AN ORGANIZED HEALTH CARE EDUCATION/TRAINING PROGRAM

## 2021-02-28 PROCEDURE — 63700000 HC SELF-ADMINISTRABLE DRUG: Performed by: NURSE PRACTITIONER

## 2021-02-28 PROCEDURE — 25000000 HC PHARMACY GENERAL: Performed by: STUDENT IN AN ORGANIZED HEALTH CARE EDUCATION/TRAINING PROGRAM

## 2021-02-28 PROCEDURE — 63700000 HC SELF-ADMINISTRABLE DRUG: Performed by: STUDENT IN AN ORGANIZED HEALTH CARE EDUCATION/TRAINING PROGRAM

## 2021-02-28 PROCEDURE — 99231 SBSQ HOSP IP/OBS SF/LOW 25: CPT | Performed by: HOSPITALIST

## 2021-02-28 PROCEDURE — 63600000 HC DRUGS/DETAIL CODE: Performed by: STUDENT IN AN ORGANIZED HEALTH CARE EDUCATION/TRAINING PROGRAM

## 2021-02-28 RX ORDER — BISACODYL 10 MG/1
10 SUPPOSITORY RECTAL DAILY PRN
Status: DISCONTINUED | OUTPATIENT
Start: 2021-02-28 | End: 2021-02-28 | Stop reason: HOSPADM

## 2021-02-28 RX ADMIN — PANTOPRAZOLE SODIUM 20 MG: 20 TABLET, DELAYED RELEASE ORAL at 07:47

## 2021-02-28 RX ADMIN — HYDROMORPHONE HYDROCHLORIDE 4 MG: 2 TABLET ORAL at 13:35

## 2021-02-28 RX ADMIN — CEFAZOLIN SODIUM 2 G: 10 POWDER, FOR SOLUTION INTRAVENOUS at 04:33

## 2021-02-28 RX ADMIN — POLYETHYLENE GLYCOL 3350 17 G: 17 POWDER, FOR SOLUTION ORAL at 07:48

## 2021-02-28 RX ADMIN — DOCUSATE SODIUM AND SENNOSIDES 1 TABLET: 8.6; 5 TABLET, FILM COATED ORAL at 07:47

## 2021-02-28 RX ADMIN — HYDROMORPHONE HYDROCHLORIDE 2 MG: 2 TABLET ORAL at 07:47

## 2021-02-28 NOTE — NURSING NOTE
Reviewed discharge instructions with patient and answered all questions. Printed out information on valium and dilaudid. Reviewed new medications with patient and advised her on medications she should stop taking (norco and evista). Patient verbalized understanding. PHUC drain removed earlier today. Gauze dressing on back is c/d/i. Provided patient with extra ABD and tape so that she can change it tonight and tomorrow after someone assess her incision. Home care will be coming out on Tuesday. Reviewed activity restrictions, showering restrictions, pain medication regimen, BM regimen, signs/syptoms of infection and follow up appointments. Her friend will be arriving soon to drive her home. Patient verbalized she has a walker at home already. Adequate pain control with oral dilaudid. Scripts for valium and dilaudid sent to patients pharmacy. Ambulated in hallway with SN twice today and has been OOB in chair. Patient did have BM today and is tolerating diet, no nausea or abdominal pain.

## 2021-02-28 NOTE — PROGRESS NOTES
Orthopedic Progress Note    Subjective     Pt seen and examined at bedside, resting comfortably. No acute events overnight. Back feeling sore this morning. She remains uneasy about her 3 hour drive home. She has not had a BM yet and this contributes to her uneasiness. She is getting miralax ,sennokot and prune juice regularly. Urinating without issue. She denies fever, chills, chest pain, SOB, numbness, tingling    Objective   Temp:  [36.2 °C (97.2 °F)-36.7 °C (98.1 °F)] 36.5 °C (97.7 °F)  Heart Rate:  [76-99] 83  Resp:  [16-18] 16  BP: (134-158)/(72-90) 158/90  SpO2:  [93 %-98 %] 98 %    General: AVSS, NAD    Spine:   Dressing C/D/I  Appropriately TTP    RLE  Skin intact  No pain with ROM of ankle/toes  SILT in sural/saphenous/DP/SP/tibial distributions  Motor intact to quad/hamstrings/EHL/FHL/TA/peroneals/GSC  Pulses 2+    LLE  Skin intact  No pain with ROM of ankle/toes  SILT in sural/saphenous/DP/SP/tibial distributions  Motor intact to quad/hamstrings/EHL/FHL/TA/peroneals/GSC  Pulses 2+      Assessment   71F S/P L2-5 PLIF on 2/24 by Dr. Singh    Plan   Continue bowel regimen  Pain control  Ancef q8 while drain in  Drain removed today  WBAT all extremities  DVT PPX: SCDs  PT/OT  AM LABS  Dispo planning: home after BM    Stephen Zapata DO, PGY-1  Orthopedic Surgery  Pager: [phone]

## 2021-02-28 NOTE — DISCHARGE SUMMARY
Inpatient Discharge Summary    BRIEF OVERVIEW  Admitting Provider:  H&P Notes 1/29/2021 to 2/28/2021         Date of Service   Author Author Type Status Note Type File Time    02/27/21 0917  Doe Salcido MD  Signed H&P 02/27/21 0917          Attending Provider: Addi Singh MD Attending phys phone: [phone]  Primary Care Physician at Discharge: Snell, DO Ty [phone]    Admission Date: 2/24/2021     Discharge Date: 2/28/2021    Primary Discharge Diagnosis  Status post lumbar spine surgery for decompression of spinal cord    Secondary Discharge Diagnosis  Active Hospital Problems    Diagnosis Date Noted   • Status post lumbar spine surgery for decompression of spinal cord 02/24/2021   • Essential hypertension 02/24/2021   • Gastroesophageal reflux disease without esophagitis 02/24/2021   • LBBB (left bundle branch block) 02/24/2021      Resolved Hospital Problems   No resolved problems to display.       DETAILS OF HOSPITAL STAY    Operative Procedures Performed  Procedure(s):  L2-5 Posterior Lumbar Decompression, Posterior Lumbar Interbody Fusion, Instrumentation, Cage, Allograft, Autograft    Consults:   Consult Notes 1/29/2021 to 2/28/2021         Date of Service   Author Author Type Status Note Type File Time    02/24/21 1931  Angel Claudio MD Physician Signed Consults 02/24/21 1933 02/19/21 0845  Josesito Tillman MD Physician Signed Consults 02/19/21 0921          Consult Orders During Admission:  IP CONSULT TO HOSPITALIST  IP CONSULT TO CASE MANAGEMENT       Imaging  X-ray Lumbar Spine 2 Or 3 Views    Result Date: 2/24/2021  IMPRESSION: Postoperative changes. COMMENT: Portable AP and lateral views of the lumbar spine were performed in the operating room. Comparison is made to intraoperative images from 06/10/2015. Previously identified posterior fusion at L4 and L5 has been extended cephalad to the L3 and L2 levels.  Spacer devices have been placed at L2-L3 and L3-L4. The L2-L3  space or devices eccentrically located anterior in the disc space.  A spacer device is again seen at L4-L5.  Grade 1 anterior spondylolisthesis of L4 on L5 is again noted.  Surgical drains have been left in place.      Presenting Problem/History of Present Illness  Status post lumbar spine surgery for decompression of spinal cord     Patient presented for L2-5 PLIF with Dr. Singh.     Exam on Day of Discharge  Patient seen and examined on day of discharge.    Spine:   Dressing C/D/I  Appropriately TTP     RLE  Skin intact  No pain with ROM of ankle/toes  SILT in sural/saphenous/DP/SP/tibial distributions  Motor intact to quad/hamstrings/EHL/FHL/TA/peroneals/GSC  Pulses 2+     LLE  Skin intact  No pain with ROM of ankle/toes  SILT in sural/saphenous/DP/SP/tibial distributions  Motor intact to quad/hamstrings/EHL/FHL/TA/peroneals/GSC  Pulses 2+    Hospital Course    Patient underwent L2-5 PLIF with Dr. Singh. She tolerated the procedure well. She was out of bed with physical therapy POD#1 with walker. Pain was well controlled with oral medications. Drain was removed POD#4. She had BM POD#4. Patient is now stable for discharge.     Discharge Orders     Medication List      START taking these medications    HYDROmorphone 2 mg tablet  Commonly known as: DILAUDID  Take 1 tablet (2 mg total) by mouth every 4 (four) hours as needed for severe pain for up to 7 days.  Dose: 2 mg     polyethylene glycol 17 gram packet  Commonly known as: MIRALAX  Take 17 g by mouth daily for 3 days.  Dose: 17 g     sennosides-docusate sodium 8.6-50 mg  Commonly known as: SENOKOT-S  Take 1 tablet by mouth 2 (two) times a day.  Dose: 1 tablet        CHANGE how you take these medications    diazePAM 5 mg tablet  Commonly known as: VALIUM  Take 1 tablet (5 mg total) by mouth every 8 (eight) hours as needed for muscle spasms for up to 7 days.  Dose: 5 mg  What changed:   · medication strength  · how much to take  · when to take this  · reasons to  take this     raloxifene 60 mg tablet  Commonly known as: EVISTA  Take 1 tablet (60 mg total) by mouth daily. HOLD UNTIL CLEARED BY DR. SINGH TO RESUME TAKING  Dose: 60 mg  What changed: additional instructions        CONTINUE taking these medications    acetaminophen 325 mg tablet  Commonly known as: TYLENOL  Take by mouth every 6 (six) hours as needed for mild pain.        STOP taking these medications    HYDROcodone-acetaminophen 7.5-325 mg per tablet  Commonly known as: NORCO          Instructions for after discharge     Follow-up with primary physician (PCP)      Within 1 week    Follow-up with provider      Call now for an appointment with Dr. Singh in 2 weeks    Addi Singh MD   [phone]    [address] [place] [id number]               Outpatient Follow-Ups  Encounter Information     You do not currently have any appointments scheduled.        Referrals:  No orders of the defined types were placed in this encounter.          Test Results Pending at Discharge  Unresulted Labs (From admission, onward)    None              Discharge Disposition    Code Status at Discharge: Full Code  Physician Order for Life-Sustaining Treatment Document Status      No documents found

## 2021-02-28 NOTE — PROGRESS NOTES
Hospital Medicine Service -  Daily Progress Note       SUBJECTIVE   Interval History: No events overnight, still having some back pain. No CP, SOB, abd pain, N/V/D/C.      OBJECTIVE      Vital signs in last 24 hours:  Temp:  [36.2 °C (97.2 °F)-36.7 °C (98.1 °F)] 36.7 °C (98.1 °F)  Heart Rate:  [76-97] 81  Resp:  [13-18] 13  BP: (134-158)/(73-90) 138/73    Intake/Output Summary (Last 24 hours) at 2/28/2021 1359  Last data filed at 2/28/2021 1300  Gross per 24 hour   Intake 840 ml   Output 1860 ml   Net -1020 ml       PHYSICAL EXAMINATION      Physical Exam    Vitals signs and nursing note reviewed.   Constitutional:       General: She is not in acute distress.     Appearance: Normal appearance. She is not ill-appearing or toxic-appearing.   HENT:      Head: Normocephalic and atraumatic.      Right Ear: External ear normal.      Left Ear: External ear normal.      Nose: Nose normal.   Eyes:      General: No scleral icterus.        Right eye: No discharge.         Left eye: No discharge.      Pupils: Pupils are equal, round, and reactive to light.   Neck:      Musculoskeletal: Normal range of motion. No neck rigidity.   Cardiovascular:      Rate and Rhythm: Normal rate and regular rhythm.      Pulses: Normal pulses.      Heart sounds: Normal heart sounds. No murmur. No friction rub. No gallop.    Pulmonary:      Effort: Pulmonary effort is normal. No respiratory distress.      Breath sounds: Normal breath sounds. No stridor. No wheezing, rhonchi or rales.   Chest:      Chest wall: No tenderness.   Abdominal:      General: Abdomen is flat. Bowel sounds are normal. There is no distension.      Palpations: Abdomen is soft. There is no mass.      Tenderness: There is no abdominal tenderness. There is no guarding or rebound.      Hernia: No hernia is present.   Musculoskeletal:         General: Tenderness present. No deformity or signs of injury.      Right lower leg: No edema.      Comments: Limited ROM due to pain in L  spine region   Skin:     General: Skin is warm and dry.      Comments: Surgical site is C/D/I, drain in place.      Neurological:      General: No focal deficit present.      Mental Status: She is alert and oriented to person, place, and time. Mental status is at baseline.   Psychiatric:         Mood and Affect: Mood normal.         Behavior: Behavior normal.         Thought Content: Thought content normal.         Judgment: Judgment normal.    LINES, CATHETERS, DRAINS, AIRWAYS, AND WOUNDS   Lines, Drains, and Airways:  Wounds (agree with documentation and present on admission):  Peripheral IV 02/24/21 Left;Posterior Wrist (Active)   Number of days: 2       Drain 1 Right;Lower;Medial Back 10 Fr. (Active)   Number of days: 2       Surgical Incision Back (Active)   Number of days: 2         Comments:      LABS / IMAGING / TELE      Labs  CBC Results       02/27/21 02/26/21 02/25/21                    0500 0336 0437         WBC 9.97 8.77 8.54         RBC 4.17 4.34 4.44         HGB 12.5 13.0 13.1         HCT 38.6 39.8 41.2         MCV 92.6 91.7 92.8         MCH 30.0 30.0 29.5         MCHC 32.4 32.7 31.8          144 156                     CMP Results       02/27/21 02/26/21 02/25/21                    0500 0336 0437          139 140         K 4.3 4.1 4.9         Cl 103 106 107         CO2 26 24 24         Glucose 156 94 145         BUN 21 16 20         Creatinine 0.7 0.8 0.8         Calcium 8.8 8.7 8.1         Anion Gap 9 9 9         EGFR >60.0 >60.0 >60.0                         SARS CoV 2 RNA (no units)   Date/Time Value   02/19/2021 0852 NOT DETECTED     No new imaging or cardio studies.       ASSESSMENT AND PLAN      LBBB (left bundle branch block)  Assessment & Plan  Stable, no new changes on EkG.         Gastroesophageal reflux disease without esophagitis  Assessment & Plan  Stable, cont with po meds, diet as tolerated.         Essential hypertension  Assessment & Plan  Stable BP, cont to monitor,  low salt diet.         * Status post lumbar spine surgery for decompression of spinal cord  Assessment & Plan  POD #4  Drain to be removed prior to dc per ortho   Pain control per ortho   Cont with PT/OT  DVT proph       VTE Assessment: Padua    VTE Prophylaxis:    Code Status: Full Code      Estimated Discharge Date: 2/28/2021   Disposition Planning: PT/OT evaluation ongoing, DC when cleared by Ortho, drain still in place.        Elizabeth Moran MD  2/28/2021

## 2021-02-28 NOTE — PLAN OF CARE
Problem: Adult Inpatient Plan of Care  Goal: Plan of Care Review  Flowsheets (Taken 2/28/2021 1309)  Plan of Care Reviewed With: patient  Outcome Summary: Chart reviewed and noted pt has d/c order to home.  Spoke with pt and she gave verbal consent for medicare letter.  [facility]able [facility]  [phone] and left message with answering service that pt was for d/c to home today.  Updated pt's d/c instructions.  PLAN remains home with [facility].  Anahi DILL   2/28/2021 3:53 PM - Received call back from [facility] at [facility] and she is aware pt is for d/c home today.  Anahi DILL

## 2021-03-01 NOTE — PROGRESS NOTES
[facility] provided clinicals to pts homecare agency, as they wanted to have it for when they see her at home. [facility] sent that through [facility]. [facility] following to assist, as needed.    PLAN:dc home with [facility].

## 2023-05-09 ENCOUNTER — HOSPITAL ENCOUNTER (OUTPATIENT)
Dept: NUCLEAR MEDICINE | Facility: HOSPITAL | Age: 73
Discharge: HOME | End: 2023-05-09

## 2023-05-09 DIAGNOSIS — M47.816 SPONDYLOSIS WITHOUT MYELOPATHY OR RADICULOPATHY, LUMBAR REGION: ICD-10-CM

## (undated) DEVICE — DRAIN FLAT 10MM

## (undated) DEVICE — MASTISOL LIQUID ADHESIVE

## (undated) DEVICE — GLOVE SZ 7.5 PROTEXIS PI

## (undated) DEVICE — SPINE TABLE COVER ULTRA COMFORT MEDIUM

## (undated) DEVICE — *T* 3.0MM MATCH HEAD PRECISION NEURO BUR

## (undated) DEVICE — SOLN DURAPREP WAND

## (undated) DEVICE — RESERVOIR DRAIN 100ML

## (undated) DEVICE — DRAPE LARGE REVERSE FOLD

## (undated) DEVICE — GLOVE SZ 7.5 LINER PROTEXIS PI BL

## (undated) DEVICE — ***USE 138470*** SUTURE ETHILON 3-0 1673BH

## (undated) DEVICE — PAD GROUND ELECTROSURGICAL W/CORD

## (undated) DEVICE — ***USE 83559***CORD BIPOLAR FORCEPS DISP E0512

## (undated) DEVICE — SUTURE VICRYL 2-0  J762D CR CP-2 18IN

## (undated) DEVICE — PACK SPINE LUMBAR

## (undated) DEVICE — TRAY URINE METER FOLEY NON-SILVER

## (undated) DEVICE — GAUZE XEROFORM 1X8 NON-STERILE PACKET

## (undated) DEVICE — SPONGE LAP 18X18 SAFE-T RFID ENHANCED XRAY

## (undated) DEVICE — BLADE BONE MILL DISP MEDIUM

## (undated) DEVICE — DRESSING SPONGE 4X4 SOF-WICK

## (undated) DEVICE — PENEVAC1 NONSTICK SMOKE EVAC

## (undated) DEVICE — DRESSING MEPILEX 4X8 BORDER

## (undated) DEVICE — ***USE 138720*** SUTURE VICRYL 1 J468H CP-1 27IN VIOLET

## (undated) DEVICE — MANIFOLD FOUR PORT NEPTUNE

## (undated) DEVICE — PACK BASIC I

## (undated) DEVICE — SURGIFLO HEMOSTATIC MATRIX 8ML

## (undated) DEVICE — SYRINGE DISP LUER-LOK  3 CC

## (undated) DEVICE — COVER BACK TABLE REINFORCED

## (undated) DEVICE — 20CM SM DISP BAYONET 1.0MM TIP

## (undated) DEVICE — PACK OR TOWEL

## (undated) DEVICE — DRESSING SPONGE ALL GAUZE 4X4 STER 10PK

## (undated) DEVICE — ***USE 138486*** SUTURE ETHILON  2-0   664G

## (undated) DEVICE — STERISTRIP 1/2INX4IN